# Patient Record
Sex: FEMALE | Race: WHITE | NOT HISPANIC OR LATINO | Employment: OTHER | URBAN - METROPOLITAN AREA
[De-identification: names, ages, dates, MRNs, and addresses within clinical notes are randomized per-mention and may not be internally consistent; named-entity substitution may affect disease eponyms.]

---

## 2023-03-24 LAB
ACETYLCHOL MODUL AB: 15 %
ACHR BLOCKING ABS, SERUM: 9 % (ref 0–26)

## 2023-04-18 ENCOUNTER — OFFICE VISIT (OUTPATIENT)
Dept: PRIMARY CARE | Facility: CLINIC | Age: 88
End: 2023-04-18
Payer: MEDICARE

## 2023-04-18 VITALS
DIASTOLIC BLOOD PRESSURE: 60 MMHG | SYSTOLIC BLOOD PRESSURE: 126 MMHG | HEIGHT: 60 IN | OXYGEN SATURATION: 96 % | WEIGHT: 156 LBS | HEART RATE: 85 BPM | BODY MASS INDEX: 30.63 KG/M2

## 2023-04-18 DIAGNOSIS — D68.51 HETEROZYGOUS FACTOR V LEIDEN MUTATION (MULTI): ICD-10-CM

## 2023-04-18 DIAGNOSIS — M81.0 OSTEOPOROSIS WITHOUT CURRENT PATHOLOGICAL FRACTURE, UNSPECIFIED OSTEOPOROSIS TYPE: Primary | ICD-10-CM

## 2023-04-18 DIAGNOSIS — L43.8 ORAL LICHEN PLANUS: ICD-10-CM

## 2023-04-18 PROBLEM — J30.9 ALLERGIC RHINITIS: Status: ACTIVE | Noted: 2023-04-18

## 2023-04-18 PROBLEM — E55.9 AVITAMINOSIS D: Status: ACTIVE | Noted: 2023-04-18

## 2023-04-18 PROBLEM — R60.9 PERIPHERAL EDEMA: Status: ACTIVE | Noted: 2023-04-18

## 2023-04-18 PROBLEM — Z99.89 USE OF CANE AS AMBULATORY AID: Status: ACTIVE | Noted: 2023-04-18

## 2023-04-18 PROBLEM — G89.29 CHRONIC LOW BACK PAIN: Status: ACTIVE | Noted: 2023-04-18

## 2023-04-18 PROBLEM — M54.50 CHRONIC LOW BACK PAIN: Status: ACTIVE | Noted: 2023-04-18

## 2023-04-18 PROBLEM — I87.2 VENOUS INSUFFICIENCY OF LEFT LOWER EXTREMITY: Status: ACTIVE | Noted: 2023-04-18

## 2023-04-18 PROBLEM — R26.89 UNSTABLE BALANCE: Status: ACTIVE | Noted: 2023-04-18

## 2023-04-18 PROBLEM — E78.5 HYPERLIPIDEMIA: Status: ACTIVE | Noted: 2023-04-18

## 2023-04-18 PROBLEM — M54.2 NECK PAIN: Status: ACTIVE | Noted: 2023-04-18

## 2023-04-18 PROBLEM — R60.0 PERIPHERAL EDEMA: Status: ACTIVE | Noted: 2023-04-18

## 2023-04-18 PROBLEM — M25.569 KNEE PAIN: Status: ACTIVE | Noted: 2023-04-18

## 2023-04-18 PROBLEM — G56.03 BILATERAL CARPAL TUNNEL SYNDROME: Status: ACTIVE | Noted: 2023-04-18

## 2023-04-18 PROCEDURE — 99214 OFFICE O/P EST MOD 30 MIN: CPT | Performed by: FAMILY MEDICINE

## 2023-04-18 PROCEDURE — 1036F TOBACCO NON-USER: CPT | Performed by: FAMILY MEDICINE

## 2023-04-18 PROCEDURE — 1159F MED LIST DOCD IN RCRD: CPT | Performed by: FAMILY MEDICINE

## 2023-04-18 PROCEDURE — 1157F ADVNC CARE PLAN IN RCRD: CPT | Performed by: FAMILY MEDICINE

## 2023-04-18 RX ORDER — CALCIUM CARBONATE 500(1250)
1 TABLET ORAL DAILY
COMMUNITY

## 2023-04-18 RX ORDER — POTASSIUM CHLORIDE 750 MG/1
1 CAPSULE, EXTENDED RELEASE ORAL DAILY
COMMUNITY
Start: 2021-12-06

## 2023-04-18 RX ORDER — ACETAMINOPHEN 500 MG
TABLET ORAL DAILY
COMMUNITY
Start: 2020-11-19

## 2023-04-18 RX ORDER — ASPIRIN 81 MG/1
1 TABLET ORAL DAILY
COMMUNITY
Start: 2020-11-19

## 2023-04-18 RX ORDER — ACETAMINOPHEN, DEXTROMETHORPHAN HBR, DOXYLAMINE SUCCINATE, PHENYLEPHRINE HCL 650; 20; 12.5; 1 MG/30ML; MG/30ML; MG/30ML; MG/30ML
SOLUTION ORAL DAILY
COMMUNITY
Start: 2020-11-19

## 2023-04-18 RX ORDER — TRIAMCINOLONE ACETONIDE 1 MG/G
PASTE DENTAL
Qty: 5 G | Refills: 1 | Status: SHIPPED | OUTPATIENT
Start: 2023-04-18 | End: 2024-03-19 | Stop reason: WASHOUT

## 2023-04-18 RX ORDER — GLUCOSAMINE SULFATE 500 MG
TABLET ORAL
COMMUNITY
Start: 2020-11-19

## 2023-04-18 ASSESSMENT — PATIENT HEALTH QUESTIONNAIRE - PHQ9
2. FEELING DOWN, DEPRESSED OR HOPELESS: NOT AT ALL
SUM OF ALL RESPONSES TO PHQ9 QUESTIONS 1 AND 2: 0
1. LITTLE INTEREST OR PLEASURE IN DOING THINGS: NOT AT ALL

## 2023-04-18 NOTE — PROGRESS NOTES
Subjective   Patient ID: Michela Mckee is a 88 y.o. female who presents for Follow-up (6 month follow up ).    HPI     Review of Systems    Objective   There were no vitals taken for this visit.    Physical Exam    Assessment/Plan
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
## 2023-04-18 NOTE — PATIENT INSTRUCTIONS
Check the dose of the vitamin D at home  Reduce the dose to half since the last level was 90.     Blood pressure is a little elevated at 140/78. Please monitor at home.     Labs in February were reviewed and looks stable.     Try the triamcinolone oral past to the mouth lesions.     Continue follow up with the vascular doctor as needed.   Continue the Lymphedema pumps at home.     DEXA on 6/8/21 showed osteopenia, not osteoporosis. due again in June 2023.  Order given     Continue follow up with the orthopedic doctor for the periodic steroid injections.     Follow up in 6 months for your Medicare Wellness Exam or sooner as needed.

## 2023-08-04 ENCOUNTER — TELEPHONE (OUTPATIENT)
Dept: PRIMARY CARE | Facility: CLINIC | Age: 88
End: 2023-08-04
Payer: MEDICARE

## 2023-10-19 ENCOUNTER — OFFICE VISIT (OUTPATIENT)
Dept: PRIMARY CARE | Facility: CLINIC | Age: 88
End: 2023-10-19
Payer: MEDICARE

## 2023-10-19 VITALS — BODY MASS INDEX: 30.47 KG/M2 | WEIGHT: 156 LBS

## 2023-10-19 DIAGNOSIS — E53.8 LOW SERUM VITAMIN B12: ICD-10-CM

## 2023-10-19 DIAGNOSIS — M25.562 CHRONIC PAIN OF BOTH KNEES: ICD-10-CM

## 2023-10-19 DIAGNOSIS — G89.29 CHRONIC MIDLINE LOW BACK PAIN WITHOUT SCIATICA: ICD-10-CM

## 2023-10-19 DIAGNOSIS — M19.042 ARTHRITIS OF BOTH HANDS: ICD-10-CM

## 2023-10-19 DIAGNOSIS — E78.2 MIXED HYPERLIPIDEMIA: ICD-10-CM

## 2023-10-19 DIAGNOSIS — M81.0 AGE RELATED OSTEOPOROSIS, UNSPECIFIED PATHOLOGICAL FRACTURE PRESENCE: ICD-10-CM

## 2023-10-19 DIAGNOSIS — I83.223: ICD-10-CM

## 2023-10-19 DIAGNOSIS — M54.50 CHRONIC MIDLINE LOW BACK PAIN WITHOUT SCIATICA: ICD-10-CM

## 2023-10-19 DIAGNOSIS — Z00.00 ROUTINE GENERAL MEDICAL EXAMINATION AT HEALTH CARE FACILITY: Primary | ICD-10-CM

## 2023-10-19 DIAGNOSIS — M19.041 ARTHRITIS OF BOTH HANDS: ICD-10-CM

## 2023-10-19 DIAGNOSIS — G89.29 CHRONIC PAIN OF BOTH KNEES: ICD-10-CM

## 2023-10-19 DIAGNOSIS — D68.51 FACTOR V LEIDEN (MULTI): ICD-10-CM

## 2023-10-19 DIAGNOSIS — E55.9 AVITAMINOSIS D: ICD-10-CM

## 2023-10-19 DIAGNOSIS — M25.561 CHRONIC PAIN OF BOTH KNEES: ICD-10-CM

## 2023-10-19 PROCEDURE — 1170F FXNL STATUS ASSESSED: CPT | Performed by: FAMILY MEDICINE

## 2023-10-19 PROCEDURE — 90662 IIV NO PRSV INCREASED AG IM: CPT | Performed by: FAMILY MEDICINE

## 2023-10-19 PROCEDURE — G0439 PPPS, SUBSEQ VISIT: HCPCS | Performed by: FAMILY MEDICINE

## 2023-10-19 PROCEDURE — 1160F RVW MEDS BY RX/DR IN RCRD: CPT | Performed by: FAMILY MEDICINE

## 2023-10-19 PROCEDURE — 1036F TOBACCO NON-USER: CPT | Performed by: FAMILY MEDICINE

## 2023-10-19 PROCEDURE — 99214 OFFICE O/P EST MOD 30 MIN: CPT | Performed by: FAMILY MEDICINE

## 2023-10-19 PROCEDURE — 1159F MED LIST DOCD IN RCRD: CPT | Performed by: FAMILY MEDICINE

## 2023-10-19 PROCEDURE — G0008 ADMIN INFLUENZA VIRUS VAC: HCPCS | Performed by: FAMILY MEDICINE

## 2023-10-19 ASSESSMENT — ACTIVITIES OF DAILY LIVING (ADL)
MANAGING_FINANCES: INDEPENDENT
TAKING_MEDICATION: NEEDS ASSISTANCE
BATHING: INDEPENDENT
GROCERY_SHOPPING: TOTAL CARE
DRESSING: INDEPENDENT
DOING_HOUSEWORK: TOTAL CARE

## 2023-10-19 ASSESSMENT — PATIENT HEALTH QUESTIONNAIRE - PHQ9
1. LITTLE INTEREST OR PLEASURE IN DOING THINGS: NOT AT ALL
SUM OF ALL RESPONSES TO PHQ9 QUESTIONS 1 AND 2: 0
2. FEELING DOWN, DEPRESSED OR HOPELESS: NOT AT ALL
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
## 2023-10-19 NOTE — PATIENT INSTRUCTIONS
Keep taking the vitamin D supplement at 2000 international units of D3 per day over the counter.    Please follow up with ortho hand for the chronic hand pain .     For the right knee pain, you could try more PT or again see Ortho for injection.     Please monitor blood pressure at home.   Goal is less than 130/80.     Continue the lymphedema pumps     Labs in February 2023 were reviewed and looks stable. Order for updated fasting blood work given today     Continue the triamcinolone oral past to the mouth lesions.     Also try the voltaren (diclofenac) gel to rub into the hands.     Continue follow up with dermatologist for cancer checks.     DEXA on 6/8/21 showed osteopenia, not osteoporosis. due again in June 2023.  Order given last visit     Continue follow up with the orthopedic doctor for the periodic steroid injections as needed.     Follow up in March 2024 to review labs months for follow up or sooner as needed.

## 2023-10-31 ENCOUNTER — ANCILLARY PROCEDURE (OUTPATIENT)
Dept: RADIOLOGY | Facility: CLINIC | Age: 88
End: 2023-10-31
Payer: MEDICARE

## 2023-10-31 DIAGNOSIS — M81.0 AGE RELATED OSTEOPOROSIS, UNSPECIFIED PATHOLOGICAL FRACTURE PRESENCE: ICD-10-CM

## 2023-10-31 PROCEDURE — 77080 DXA BONE DENSITY AXIAL: CPT | Performed by: RADIOLOGY

## 2023-10-31 PROCEDURE — 77080 DXA BONE DENSITY AXIAL: CPT

## 2024-03-14 ENCOUNTER — LAB (OUTPATIENT)
Dept: LAB | Facility: LAB | Age: 89
End: 2024-03-14
Payer: MEDICARE

## 2024-03-14 DIAGNOSIS — E53.8 LOW SERUM VITAMIN B12: ICD-10-CM

## 2024-03-14 DIAGNOSIS — E78.2 MIXED HYPERLIPIDEMIA: ICD-10-CM

## 2024-03-14 DIAGNOSIS — D68.51 FACTOR V LEIDEN (MULTI): ICD-10-CM

## 2024-03-14 DIAGNOSIS — E55.9 AVITAMINOSIS D: ICD-10-CM

## 2024-03-14 LAB
25(OH)D3 SERPL-MCNC: 60 NG/ML (ref 30–100)
ALBUMIN SERPL BCP-MCNC: 4.3 G/DL (ref 3.4–5)
ALP SERPL-CCNC: 96 U/L (ref 33–136)
ALT SERPL W P-5'-P-CCNC: 18 U/L (ref 7–45)
ANION GAP SERPL CALC-SCNC: 14 MMOL/L (ref 10–20)
AST SERPL W P-5'-P-CCNC: 17 U/L (ref 9–39)
BASOPHILS # BLD AUTO: 0.06 X10*3/UL (ref 0–0.1)
BASOPHILS NFR BLD AUTO: 0.6 %
BILIRUB SERPL-MCNC: 0.6 MG/DL (ref 0–1.2)
BUN SERPL-MCNC: 21 MG/DL (ref 6–23)
CALCIUM SERPL-MCNC: 9.9 MG/DL (ref 8.6–10.6)
CHLORIDE SERPL-SCNC: 105 MMOL/L (ref 98–107)
CHOLEST SERPL-MCNC: 213 MG/DL (ref 0–199)
CHOLESTEROL/HDL RATIO: 3.6
CO2 SERPL-SCNC: 27 MMOL/L (ref 21–32)
CREAT SERPL-MCNC: 0.73 MG/DL (ref 0.5–1.05)
EGFRCR SERPLBLD CKD-EPI 2021: 79 ML/MIN/1.73M*2
EOSINOPHIL # BLD AUTO: 0.25 X10*3/UL (ref 0–0.4)
EOSINOPHIL NFR BLD AUTO: 2.3 %
ERYTHROCYTE [DISTWIDTH] IN BLOOD BY AUTOMATED COUNT: 13.4 % (ref 11.5–14.5)
GLUCOSE SERPL-MCNC: 124 MG/DL (ref 74–99)
HCT VFR BLD AUTO: 44.3 % (ref 36–46)
HDLC SERPL-MCNC: 59.9 MG/DL
HGB BLD-MCNC: 14.4 G/DL (ref 12–16)
IMM GRANULOCYTES # BLD AUTO: 0.04 X10*3/UL (ref 0–0.5)
IMM GRANULOCYTES NFR BLD AUTO: 0.4 % (ref 0–0.9)
LDLC SERPL CALC-MCNC: 133 MG/DL
LYMPHOCYTES # BLD AUTO: 1.99 X10*3/UL (ref 0.8–3)
LYMPHOCYTES NFR BLD AUTO: 18.6 %
MCH RBC QN AUTO: 28.9 PG (ref 26–34)
MCHC RBC AUTO-ENTMCNC: 32.5 G/DL (ref 32–36)
MCV RBC AUTO: 89 FL (ref 80–100)
MONOCYTES # BLD AUTO: 1.12 X10*3/UL (ref 0.05–0.8)
MONOCYTES NFR BLD AUTO: 10.5 %
NEUTROPHILS # BLD AUTO: 7.24 X10*3/UL (ref 1.6–5.5)
NEUTROPHILS NFR BLD AUTO: 67.6 %
NON HDL CHOLESTEROL: 153 MG/DL (ref 0–149)
NRBC BLD-RTO: 0 /100 WBCS (ref 0–0)
PLATELET # BLD AUTO: 229 X10*3/UL (ref 150–450)
POTASSIUM SERPL-SCNC: 4.4 MMOL/L (ref 3.5–5.3)
PROT SERPL-MCNC: 6.9 G/DL (ref 6.4–8.2)
RBC # BLD AUTO: 4.98 X10*6/UL (ref 4–5.2)
SODIUM SERPL-SCNC: 142 MMOL/L (ref 136–145)
TRIGL SERPL-MCNC: 102 MG/DL (ref 0–149)
TSH SERPL-ACNC: 1.64 MIU/L (ref 0.44–3.98)
VIT B12 SERPL-MCNC: >2000 PG/ML (ref 211–911)
VLDL: 20 MG/DL (ref 0–40)
WBC # BLD AUTO: 10.7 X10*3/UL (ref 4.4–11.3)

## 2024-03-14 PROCEDURE — 85025 COMPLETE CBC W/AUTO DIFF WBC: CPT

## 2024-03-14 PROCEDURE — 80053 COMPREHEN METABOLIC PANEL: CPT

## 2024-03-14 PROCEDURE — 36415 COLL VENOUS BLD VENIPUNCTURE: CPT

## 2024-03-14 PROCEDURE — 82306 VITAMIN D 25 HYDROXY: CPT

## 2024-03-14 PROCEDURE — 80061 LIPID PANEL: CPT

## 2024-03-14 PROCEDURE — 82607 VITAMIN B-12: CPT

## 2024-03-14 PROCEDURE — 84443 ASSAY THYROID STIM HORMONE: CPT

## 2024-03-19 ENCOUNTER — OFFICE VISIT (OUTPATIENT)
Dept: PRIMARY CARE | Facility: CLINIC | Age: 89
End: 2024-03-19
Payer: MEDICARE

## 2024-03-19 VITALS
DIASTOLIC BLOOD PRESSURE: 70 MMHG | BODY MASS INDEX: 30.86 KG/M2 | OXYGEN SATURATION: 87 % | HEART RATE: 87 BPM | SYSTOLIC BLOOD PRESSURE: 122 MMHG | WEIGHT: 158 LBS

## 2024-03-19 DIAGNOSIS — R26.89 UNSTABLE BALANCE: ICD-10-CM

## 2024-03-19 DIAGNOSIS — M19.042 ARTHRITIS OF BOTH HANDS: ICD-10-CM

## 2024-03-19 DIAGNOSIS — H53.9 CHANGE IN VISION: Primary | ICD-10-CM

## 2024-03-19 DIAGNOSIS — G89.29 CHRONIC MIDLINE LOW BACK PAIN WITHOUT SCIATICA: ICD-10-CM

## 2024-03-19 DIAGNOSIS — M25.561 CHRONIC PAIN OF BOTH KNEES: ICD-10-CM

## 2024-03-19 DIAGNOSIS — G89.29 CHRONIC PAIN OF BOTH KNEES: ICD-10-CM

## 2024-03-19 DIAGNOSIS — M25.562 CHRONIC PAIN OF BOTH KNEES: ICD-10-CM

## 2024-03-19 DIAGNOSIS — M54.50 CHRONIC MIDLINE LOW BACK PAIN WITHOUT SCIATICA: ICD-10-CM

## 2024-03-19 DIAGNOSIS — Z99.89 USE OF CANE AS AMBULATORY AID: ICD-10-CM

## 2024-03-19 DIAGNOSIS — M19.041 ARTHRITIS OF BOTH HANDS: ICD-10-CM

## 2024-03-19 PROCEDURE — 1158F ADVNC CARE PLAN TLK DOCD: CPT | Performed by: FAMILY MEDICINE

## 2024-03-19 PROCEDURE — 1160F RVW MEDS BY RX/DR IN RCRD: CPT | Performed by: FAMILY MEDICINE

## 2024-03-19 PROCEDURE — 99214 OFFICE O/P EST MOD 30 MIN: CPT | Performed by: FAMILY MEDICINE

## 2024-03-19 PROCEDURE — 1036F TOBACCO NON-USER: CPT | Performed by: FAMILY MEDICINE

## 2024-03-19 PROCEDURE — 1157F ADVNC CARE PLAN IN RCRD: CPT | Performed by: FAMILY MEDICINE

## 2024-03-19 PROCEDURE — 1123F ACP DISCUSS/DSCN MKR DOCD: CPT | Performed by: FAMILY MEDICINE

## 2024-03-19 PROCEDURE — 1159F MED LIST DOCD IN RCRD: CPT | Performed by: FAMILY MEDICINE

## 2024-03-19 RX ORDER — CHOLECALCIFEROL (VITAMIN D3) 25 MCG
1000 TABLET ORAL DAILY
COMMUNITY

## 2024-03-19 ASSESSMENT — PATIENT HEALTH QUESTIONNAIRE - PHQ9
SUM OF ALL RESPONSES TO PHQ9 QUESTIONS 1 AND 2: 0
2. FEELING DOWN, DEPRESSED OR HOPELESS: NOT AT ALL
1. LITTLE INTEREST OR PLEASURE IN DOING THINGS: NOT AT ALL

## 2024-03-19 NOTE — PATIENT INSTRUCTIONS
Keep taking the vitamin D supplement at 2000 international units of D3 per day over the counter.    Please follow up with ortho hand for the chronic hand pain .     Labs on 3/14/24- Sugar was a little elevated which we can monitor. Normal liver and kidney function. Cholesterol levels look stable. Make sure to stay hydrated. Normal blood counts, no evidence of anemia or infection. B12 is high so please hold/stop any B vitamin supplements. Normal thyroid and vitamin D levels.     For the right knee pain, you could try more PT or again see Ortho for injection. Call Dr Ceron for follow up     Please monitor blood pressure at home. BP is good today at 122/70.   Goal is less than 130/80.     Continue the lymphedema pumps   For the leg swelling, consider getting a chair to get the legs elevated.     Continue the triamcinolone oral past to the mouth lesions as needed     Also try the voltaren (diclofenac) gel to rub into the hands, you can follow up with the hand specialist     Continue follow up with dermatologist for cancer checks.     New handicap placard     DEXA on 6/8/21 showed osteopenia, not osteoporosis. due again in June 2023.  Order given last visit     Follow up in 6 months for your Yearly Medicare Wellness  for a follow up
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
## 2024-04-16 ENCOUNTER — APPOINTMENT (OUTPATIENT)
Dept: PHYSICAL THERAPY | Facility: CLINIC | Age: 89
End: 2024-04-16
Payer: MEDICARE

## 2024-04-16 ENCOUNTER — APPOINTMENT (OUTPATIENT)
Dept: OCCUPATIONAL THERAPY | Facility: CLINIC | Age: 89
End: 2024-04-16
Payer: MEDICARE

## 2024-07-01 ENCOUNTER — TELEPHONE (OUTPATIENT)
Dept: PRIMARY CARE | Facility: CLINIC | Age: 89
End: 2024-07-01
Payer: MEDICARE

## 2024-07-01 VITALS — BODY MASS INDEX: 30.86 KG/M2 | HEIGHT: 60 IN

## 2024-07-01 DIAGNOSIS — U07.1 COVID-19: Primary | ICD-10-CM

## 2024-07-01 RX ORDER — BENZONATATE 100 MG/1
100 CAPSULE ORAL 3 TIMES DAILY PRN
Qty: 42 CAPSULE | Refills: 0 | Status: SHIPPED | OUTPATIENT
Start: 2024-07-01 | End: 2024-07-31

## 2024-07-01 RX ORDER — ALBUTEROL SULFATE 90 UG/1
2 AEROSOL, METERED RESPIRATORY (INHALATION) EVERY 4 HOURS PRN
Qty: 8.5 G | Refills: 5 | Status: SHIPPED | OUTPATIENT
Start: 2024-07-01 | End: 2025-07-01

## 2024-07-01 RX ORDER — FLUTICASONE PROPIONATE 50 MCG
1 SPRAY, SUSPENSION (ML) NASAL DAILY
Qty: 16 G | Refills: 5 | Status: SHIPPED | OUTPATIENT
Start: 2024-07-01 | End: 2025-07-01

## 2024-07-01 NOTE — TELEPHONE ENCOUNTER
Patient's daughter, Kimberly ([phone]) called stating patient started with congestion and cough yesterday. Did home COVID test and was positive. Any rx sent to [facility]    Pt is 89 with COVID   Very congested and some shortness of breath in the nasal area   Some cough   - recent travel, back from a trip seeing son, came back on a plan on Saturday   - getting a cold on Saturday, got worse  - tested positive last night   Not on a statin  No T2DM  BMI 31   GFR 79 on 3/14/24     Sent in the paxlovid  ,floanse, albuterol , and benzonate

## 2024-07-02 ENCOUNTER — APPOINTMENT (OUTPATIENT)
Dept: DERMATOLOGY | Facility: CLINIC | Age: 89
End: 2024-07-02
Payer: MEDICARE

## 2024-07-08 ENCOUNTER — APPOINTMENT (OUTPATIENT)
Dept: DERMATOLOGY | Facility: CLINIC | Age: 89
End: 2024-07-08
Payer: MEDICARE

## 2024-08-27 ENCOUNTER — APPOINTMENT (OUTPATIENT)
Dept: DERMATOLOGY | Facility: CLINIC | Age: 89
End: 2024-08-27
Payer: MEDICARE

## 2024-08-27 DIAGNOSIS — L57.9 SKIN CHANGES DUE TO CHRONIC EXPOSURE TO NONIONIZING RADIATION: ICD-10-CM

## 2024-08-27 DIAGNOSIS — L81.4 LENTIGO: ICD-10-CM

## 2024-08-27 DIAGNOSIS — D18.01 ANGIOMA OF SKIN: Primary | ICD-10-CM

## 2024-08-27 DIAGNOSIS — L21.9 SEBORRHEIC DERMATITIS: ICD-10-CM

## 2024-08-27 DIAGNOSIS — D22.9 BENIGN NEVUS: ICD-10-CM

## 2024-08-27 DIAGNOSIS — Z85.828 HISTORY OF NONMELANOMA SKIN CANCER: ICD-10-CM

## 2024-08-27 DIAGNOSIS — L82.1 SEBORRHEIC KERATOSIS: ICD-10-CM

## 2024-08-27 DIAGNOSIS — L90.5 SCAR CONDITIONS AND FIBROSIS OF SKIN: ICD-10-CM

## 2024-08-27 PROCEDURE — 99213 OFFICE O/P EST LOW 20 MIN: CPT | Performed by: NURSE PRACTITIONER

## 2024-08-27 PROCEDURE — 1036F TOBACCO NON-USER: CPT | Performed by: NURSE PRACTITIONER

## 2024-08-27 PROCEDURE — 1123F ACP DISCUSS/DSCN MKR DOCD: CPT | Performed by: NURSE PRACTITIONER

## 2024-08-27 PROCEDURE — 1160F RVW MEDS BY RX/DR IN RCRD: CPT | Performed by: NURSE PRACTITIONER

## 2024-08-27 PROCEDURE — 1159F MED LIST DOCD IN RCRD: CPT | Performed by: NURSE PRACTITIONER

## 2024-08-27 PROCEDURE — 1157F ADVNC CARE PLAN IN RCRD: CPT | Performed by: NURSE PRACTITIONER

## 2024-08-27 NOTE — PATIENT INSTRUCTIONS

## 2024-08-27 NOTE — PROGRESS NOTES
Subjective     Michela Mckee is a 89 y.o. female who presents for the following: Skin Check.     Review of Systems:  No other skin or systemic complaints other than what is documented elsewhere in the note.    The following portions of the chart were reviewed this encounter and updated as appropriate:   Tobacco  Allergies  Meds  Problems  Med Hx  Surg Hx         Skin Cancer History  No skin cancer on file.      Specialty Problems          Dermatology Problems    Oral lichen planus        Objective   Well appearing patient in no apparent distress; mood and affect are within normal limits.    A full examination was performed including scalp, head, eyes, ears, nose, lips, neck, chest, axillae, abdomen, back, buttocks, bilateral upper extremities, bilateral lower extremities, hands, feet, fingers, toes, fingernails, and toenails. All findings within normal limits unless otherwise noted below.      Assessment/Plan   1. Angioma of skin  Scattered cherry-red papule(s).    A cherry hemangioma is a small macule (small, flat, smooth area) or papule (small, solid bump) formed from an overgrowth of tiny blood vessels in the skin. Cherry hemangiomas are characteristically red or purplish in color. They often first appear in middle adulthood and usually increase in number with age. Cherry hemangiomas are noncancerous (benign) and are common in adults.    The present appearance of the lesion is not worrisome but it should continue to be observed and testing/treatment may be warranted if change occurs.    2. Benign nevus  Scattered, uniform and benign-appearing, regular brown melanocytic papules and macules.    The present appearance of the lesion is not worrisome but it should continue to be observed and testing/treatment may be warranted if change occurs.    3. Seborrheic keratosis  Stuck on verrucous, tan-brown papules and plaques.      Seborrheic keratoses are common noncancerous (benign) growths of unknown cause seen  in adults due to a thickening of an area of the top skin layer. Seborrheic keratoses may appear as if they are stuck on to the skin. They have distinct borders, and they may appear as papules (small, solid bumps) or plaques (solid, raised patches that are bigger than a thumbnail). They may be the same color as your skin, or they may be pink, light brown, darker brown, or very dark brown, or sometimes may appear black.    There is no way to prevent new seborrheic keratoses from forming. Seborrheic keratoses can be removed, but removal is considered a cosmetic issue and is usually not covered by insurance.    PLAN  No treatment is needed unless there is irritation from clothing, such as itching or bleeding.  2.   Some lotions containing alpha hydroxy acids, salicylic acid, or urea may make the areas feel smoother with regular use but will not eliminate them.    4. Lentigo  Scattered tan macules in sun-exposed areas.    A solar lentigo (plural, solar lentigines), also known as a sun-induced freckle or senile lentigo, is a dark (hyperpigmented) lesion caused by natural or artificial ultraviolet (UV) light. Solar lentigines may be single or multiple. This type of lentigo is different from a simple lentigo (lentigo simplex) because it is caused by exposure to UV light. Solar lentigines are benign, but they do indicate excessive sun exposure, a risk factor for the development of skin cancer.    To prevent solar lentigines, avoid exposure to sunlight in midday (10 AM to 3 PM), wear sun-protective clothing (tightly woven clothes and hats), and apply sunscreen (SPF 30 UVA and UVB block).    The present appearance of the lesion is not worrisome but it should continue to be observed and testing/treatment may be warranted if change occurs.    5. Scar conditions and fibrosis of skin  Left upper lip  Well healed scar at the site(s) of prior treatment with no evidence of recurrence.          The scar is clear, there is no evidence  of recurrence.  The present appearance of the scar is not worrisome but it should continue to be observed and testing/treatment may be warranted if change occurs.      6. History of nonmelanoma skin cancer    ABCDEs of melanoma and atypical moles were discussed with the patient.    Patient was instructed to perform monthly self skin examination.  We recommended that the patient have regular full skin exams given an increased risk of subsequent skin cancers.    The patient was instructed to use sun protective behaviors including use of broad spectrum sunscreens and sun protective clothing to reduce risk of skin cancers.    Warning signs of non-melanoma skin cancer discussed.    7. Skin changes due to chronic exposure to nonionizing radiation  Actinic changes in the form of freckles, lentigines and hyper/hypopigmentation     ABCDEs of melanoma and atypical moles were discussed with the patient.    Patient was instructed to perform monthly self skin examination.  We recommended that the patient have regular full skin exams given an increased risk of subsequent skin cancers.    The patient was instructed to use sun protective behaviors including use of broad spectrum sunscreens and sun protective clothing to reduce risk of skin cancers.    Warning signs of non-melanoma skin cancer discussed.    8. Seborrheic dermatitis  Head - Anterior (Face)  Erythematous papules or plaques with greasy scale involving eyebrows, forehead, nose and medial cheeks (R>L cheek)    PLAN    Declined ketoconazole cream but would like to use clotrimazole OTC BID.         Return to clinic in 1 year for skin check/follow up or sooner if needed

## 2024-08-28 NOTE — PROGRESS NOTES
Physical Therapy Evaluation/Treatment    Patient Name: Michela Mckee  MRN: [id number]  Encounter Date: 2024       Visit Number:  1/*** (including evaluation)  Planned total visits: ***  Visit Authorized/insurance considerations:  MEDICARE MMO / NO AUTH / $0 CO PAY / OOP $3000 MET $0 / [place] VISITS / AVAILITY # [id number] MMO REF# [id number]   JS  Progress Report due visit #***    Current Problem:  Problem List Items Addressed This Visit    None      Subjective:  Subjective     SUBJECTIVE:  Main complaint:  ***  Onset Date:  Rx:  2024;  ***  Date of Injury:  ***    Patient reported hx of injury:   ***    What aggravates symptoms:  {sit, bend, stand, walk, lift, twist, lying supine, lying prone, sidelyin}     What improves symptoms:  {sit, bend, stand, walk, lift, twist, lying supine, lying prone, sidelyin}     Previous Medical Treatment:    ***    Relevant PMH:  ***    Red flags:  {basRedFlags:00709}  ***    Imaging:  {BASDiagnostics:55875}  ***    No results found.     Previous Therapy Treatments:    {Previous PT services:01044}  Successful:  {yes/no:31814}  ***  ***    Pt stated Goal:  ***    General:       Precautions:       Pain:       Home Living:       Home type: {dkhousetype:35805}  Stairs: {yes,no:98596}  Lives with: {dkliveswith:12860}    Vocation:    {BASVocation:26791}  Job/Job tasks:  ***    Prior Function Per Pt/Caregiver Report:       OBJECTIVE:  ***    Posture:       Posture:     ROM and Strength:    Lumbar:      {MarjoriengthROMwnlwflsevimal:59501}    Lumbar AROM STRENGTH    Flexion *** ***    Extension *** ***    /////////////////////////////////////////////////////////////    AROM STRENGTH    R L R L   Rotation *** *** *** ***   Sidebend *** *** *** ***     Hip:    {BASstrengthROMwnlwflseebelow:74195}       AROM STRENGTH   Hip R L R L   Hip Flexion *** *** ***/5 ***/5   Hip Extension *** *** ***/5 ***/5   Hip Abduction *** *** ***/5 ***/5   Hip Adduction *** *** ***/5  ***/5   Internal Rotation *** *** ***/5 ***/5   External Rotation *** *** ***/5 ***/5     Knee:    {BASstrengthROMwnlwflseebelow:18696}     AROM STRENGTH   Knee R L R L   Knee Flexion *** *** ***/5 ***/5   Knee Extension *** *** ***/5 ***/5     Ankle:      {BASstrengthROMwnlwflseebelow:96029}       AROM STRENGTH   Ankle R L R L   Dorsiflexion *** *** ***/5 ***/5   Plantarflexion *** *** ***/5 ***/5   Eversion *** *** ***/5 ***/5   Inversion *** *** ***/5 ***/5        Flexibility:       R  L   Pectoralis *** ***   Piriformis *** ***   [place] *** ***   Hip flexor *** ***   Iliotibial band *** ***   Quadriceps *** ***   Hamstring *** ***   Gastroc/Soleus *** ***        Special Tests:     Lumbar  Repeated Flexion in Standing {POSITIVE/NEGATIVE:87299}  after *** times   Repeated Extension in Standing {POSITIVE/NEGATIVE:26050}  after *** times   /////////////////////////////////////////////////////////////////////    Right Left   Anterior Translatory Instability Test {POSITIVE/NEGATIVE:69214} {POSITIVE/NEGATIVE:40412}     Neural   Right Left   Slump {POSITIVE/NEGATIVE:75175} {POSITIVE/NEGATIVE:36811}   SLR {POSITIVE/NEGATIVE:58639} {POSITIVE/NEGATIVE:11731}     Hip   Right Left   Penny {POSITIVE/NEGATIVE:85101} {POSITIVE/NEGATIVE:37594}   Robert {POSITIVE/NEGATIVE:48308} {POSITIVE/NEGATIVE:78885}   Piriformis {POSITIVE/NEGATIVE:04371} {POSITIVE/NEGATIVE:57694}   Scour {POSITIVE/NEGATIVE:24225} {POSITIVE/NEGATIVE:25817}   [place] {POSITIVE/NEGATIVE:02207} {POSITIVE/NEGATIVE:03936}     Pelvis    Comment   Iliac crest {BASsymmetrical/asymmetrical:76891} ***   ASIS {BASsymmetrical/asymmetrical:21609} ***   PSIS {BASsymmetrical/asymmetrical:90706} ***   Sacral base {BASsymmetrical/asymmetrical:76680} ***   Leg length {BASsymmetrical/asymmetrical:59079} ***        Palpation:    Palpation:       Gait:       Balance:       Stairs:       Transfers:       Outcome Measures:  {PT Outcome Measures:64241}     Assessment       Pt is a 89  y.o. female who presents with impairments of ***.  Pt would benefit from skilled physical therapy to improve flexibility, ROM, strength to reduce pain and improve the patient's current level of functioning including routine exercise program.  Pt would also benefit from proper body mechanics and ergonomic training to better manage the patient's symptoms and reduce exacerbation.      Pt's recovery time and progress/outcomes will likely be impacted by the patient;s history of *** and ***.    Plan       OP EDUCATION:       Goals:      Treatments this date:  {PT Treatments:79060}    Perform HEP as instructed and according to handouts.  Monitor pain levels, stop any exercises that increases pain level and report to therapist next visit.      Billed Treatment Times:  {Treatement times:23788}    Therapeutic modalities this date:       Billed Treatment Times:  {Treatement times:24652}    Therapeutic Activities:  {Activity:59026}    OP PT EDUCATION:      ***    Billed Treatment Times:  {Treatement times:15690}    Manual:    ***  Billed Treatment Times:  {Treatement times:57594}    Balance/NMRE:     ***    Billed Treatment Times:  {Treatement times:57212}    Plan for next visit:  ***    1.  Improve LE AROM by ** degrees at deficits  2.  Improve LE strength by ½ mm grade at deficits  3.  Improve bilateral hamstring length to **% of WFL  4.  Improve trunk flexibility to **% of WFL  5.  Improve trunk strength to ** or better  6.  Pain:  ** to **  7.  Functional Outcome Measure:  ** (**%)

## 2024-08-29 ENCOUNTER — TELEPHONE (OUTPATIENT)
Dept: PRIMARY CARE | Facility: CLINIC | Age: 89
End: 2024-08-29
Payer: MEDICARE

## 2024-08-29 NOTE — TELEPHONE ENCOUNTER
Patients daughter lvm asking for a new order for for physical therapy, the order that are in are going to  before her appt.

## 2024-09-05 ENCOUNTER — TELEPHONE (OUTPATIENT)
Dept: PRIMARY CARE | Facility: CLINIC | Age: 89
End: 2024-09-05
Payer: MEDICARE

## 2024-09-24 ENCOUNTER — APPOINTMENT (OUTPATIENT)
Dept: PRIMARY CARE | Facility: CLINIC | Age: 89
End: 2024-09-24
Payer: MEDICARE

## 2024-09-24 VITALS — SYSTOLIC BLOOD PRESSURE: 128 MMHG | HEART RATE: 71 BPM | DIASTOLIC BLOOD PRESSURE: 70 MMHG | OXYGEN SATURATION: 97 %

## 2024-09-24 DIAGNOSIS — E53.8 LOW SERUM VITAMIN B12: ICD-10-CM

## 2024-09-24 DIAGNOSIS — D68.51 FACTOR V LEIDEN (MULTI): ICD-10-CM

## 2024-09-24 DIAGNOSIS — E55.9 AVITAMINOSIS D: ICD-10-CM

## 2024-09-24 DIAGNOSIS — Z00.00 ROUTINE GENERAL MEDICAL EXAMINATION AT HEALTH CARE FACILITY: Primary | ICD-10-CM

## 2024-09-24 DIAGNOSIS — E78.2 MIXED HYPERLIPIDEMIA: ICD-10-CM

## 2024-09-24 DIAGNOSIS — R26.89 UNSTABLE BALANCE: ICD-10-CM

## 2024-09-24 PROBLEM — I83.223: Status: RESOLVED | Noted: 2023-10-19 | Resolved: 2024-09-24

## 2024-09-24 PROCEDURE — 1157F ADVNC CARE PLAN IN RCRD: CPT | Performed by: FAMILY MEDICINE

## 2024-09-24 PROCEDURE — G0439 PPPS, SUBSEQ VISIT: HCPCS | Performed by: FAMILY MEDICINE

## 2024-09-24 PROCEDURE — 99213 OFFICE O/P EST LOW 20 MIN: CPT | Performed by: FAMILY MEDICINE

## 2024-09-24 PROCEDURE — G0008 ADMIN INFLUENZA VIRUS VAC: HCPCS | Performed by: FAMILY MEDICINE

## 2024-09-24 PROCEDURE — 1160F RVW MEDS BY RX/DR IN RCRD: CPT | Performed by: FAMILY MEDICINE

## 2024-09-24 PROCEDURE — 1159F MED LIST DOCD IN RCRD: CPT | Performed by: FAMILY MEDICINE

## 2024-09-24 PROCEDURE — 99397 PER PM REEVAL EST PAT 65+ YR: CPT | Performed by: FAMILY MEDICINE

## 2024-09-24 PROCEDURE — 1123F ACP DISCUSS/DSCN MKR DOCD: CPT | Performed by: FAMILY MEDICINE

## 2024-09-24 PROCEDURE — 1036F TOBACCO NON-USER: CPT | Performed by: FAMILY MEDICINE

## 2024-09-24 PROCEDURE — 90662 IIV NO PRSV INCREASED AG IM: CPT | Performed by: FAMILY MEDICINE

## 2024-09-24 PROCEDURE — 1170F FXNL STATUS ASSESSED: CPT | Performed by: FAMILY MEDICINE

## 2024-09-24 RX ORDER — FUROSEMIDE 20 MG/1
20 TABLET ORAL DAILY
COMMUNITY
Start: 2021-12-06

## 2024-09-24 ASSESSMENT — PATIENT HEALTH QUESTIONNAIRE - PHQ9
2. FEELING DOWN, DEPRESSED OR HOPELESS: NOT AT ALL
1. LITTLE INTEREST OR PLEASURE IN DOING THINGS: NOT AT ALL
SUM OF ALL RESPONSES TO PHQ9 QUESTIONS 1 AND 2: 0

## 2024-09-24 ASSESSMENT — ACTIVITIES OF DAILY LIVING (ADL)
GROCERY_SHOPPING: NEEDS ASSISTANCE
TAKING_MEDICATION: INDEPENDENT
BATHING: INDEPENDENT
DOING_HOUSEWORK: NEEDS ASSISTANCE
MANAGING_FINANCES: INDEPENDENT
DRESSING: INDEPENDENT

## 2024-09-24 NOTE — LETTER
Dear Physical Therapist,     Please help Ms. Mckee with mobility and strength. We would like to focus on strength to get up from a chair with armrest. Also getting in and about of the bed, shower ( stepping over the tub wall), and a car. We want to keep Ms. Mckee in her home as long as possible and also to help reduce risk of falls and injuries.     Please feel free to contact me for any further questions or concerns.     Thank you,       Khushi Mccabe DO, [facility]  Board Certified Family Physician   [address] [place] [address].   [id number]  [place] [address]  P: [phone]  F: [phone]

## 2024-09-24 NOTE — PATIENT INSTRUCTIONS
Keep taking the vitamin D supplement at 2000 international units of D3 per day over the counter.    Start the PT for the leg strengthening     Labs on 3/14/24- Sugar was a little elevated which we can monitor. Normal liver and kidney function. Cholesterol levels look stable. Make sure to stay hydrated. Normal blood counts, no evidence of anemia or infection. B12 is high so please hold/stop any B vitamin supplements. Normal thyroid and vitamin D levels.     For the right knee pain, you could try more PT or again see Ortho for injection.     Please monitor blood pressure at home. BP is good today at 128/70.   Goal is less than 130/80.     Continue the lymphedema pumps   For the leg swelling, consider getting a chair to get the legs elevated.     Also try the voltaren (diclofenac) gel to rub into the hands, you can follow up with the hand specialist as needed, glad the hand procedure went well    Continue follow up with dermatologist for cancer checks.     DEXA was on 10/31/23-Bone density showed persistence of the osteopenia, this is good. We can repeat in 2-3 years if she would like.     Follow up in 6 months for a follow up

## 2024-09-24 NOTE — PROGRESS NOTES
DEXA shows ostopenia, repeat in 2 years (Oct 2023)   Overall: no updates  Diet, exercise, water: Has two meals per day with snacks, eats healthy. Doesn't drink much water (couple glasses plus coffee). Does not exercise- difficulty walking from knees.   Starting PT tomorrow for back and knees. Frustrated with previous PT experience.   Sleep: never sleeps well, gets 5 hours per night with waking up. Feels well rested. No concerns.   Hands doing good, just some numbness on tips of bilateral fingers. Exercises hands.   Bowel, bladder:   Vision: corner of right eye seeing things, like previous left eye. Saw people clearly last year out of left. Right eye is a 'wall like a closet' full of colors.   Migraines changed- started small and grew, now small and focused. Taking pills for mac degen?   Meds: No concerns with meds, only takes vitamins.   Chest pain, SOB, swell: No concerns.   Saw vein specialist in the past, worked, may need to see again. Current swelling.   Mood: No concerns.   Eufexa shot in right knee in [place], 3 shot series repeated every 6 months (Oct) for pain  Left hand surgery recently to allow movement of fingers (May 2024) with Dr. Walker     Doesn't recall ever having inhaler.   Uses flonase rarely (twice a year)   Restart vitamin B? COVID vaccines?   Had COVID July 1st, received paxlovid
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
## 2024-09-25 ENCOUNTER — EVALUATION (OUTPATIENT)
Dept: PHYSICAL THERAPY | Facility: CLINIC | Age: 89
End: 2024-09-25
Payer: MEDICARE

## 2024-09-25 ENCOUNTER — APPOINTMENT (OUTPATIENT)
Dept: PHYSICAL THERAPY | Facility: CLINIC | Age: 89
End: 2024-09-25
Payer: MEDICARE

## 2024-09-25 DIAGNOSIS — Z99.89 DEPENDENCE ON OTHER ENABLING MACHINES AND DEVICES: ICD-10-CM

## 2024-09-25 DIAGNOSIS — M25.561 PAIN IN RIGHT KNEE: ICD-10-CM

## 2024-09-25 DIAGNOSIS — R26.89 OTHER ABNORMALITIES OF GAIT AND MOBILITY: ICD-10-CM

## 2024-09-25 DIAGNOSIS — M25.562 PAIN IN LEFT KNEE: ICD-10-CM

## 2024-09-25 DIAGNOSIS — M54.50 LOW BACK PAIN, UNSPECIFIED: ICD-10-CM

## 2024-09-25 DIAGNOSIS — G89.29 OTHER CHRONIC PAIN: ICD-10-CM

## 2024-09-25 DIAGNOSIS — M19.041 PRIMARY OSTEOARTHRITIS, RIGHT HAND: ICD-10-CM

## 2024-09-25 DIAGNOSIS — M19.042 PRIMARY OSTEOARTHRITIS, LEFT HAND: ICD-10-CM

## 2024-09-25 PROCEDURE — 97162 PT EVAL MOD COMPLEX 30 MIN: CPT | Mod: GP | Performed by: PHYSICAL THERAPIST

## 2024-09-25 ASSESSMENT — PAIN SCALES - GENERAL: PAINLEVEL_OUTOF10: 0 - NO PAIN

## 2024-09-25 ASSESSMENT — PAIN - FUNCTIONAL ASSESSMENT: PAIN_FUNCTIONAL_ASSESSMENT: 0-10

## 2024-09-25 NOTE — PROGRESS NOTES
Physical Therapy Evaluation/Treatment    Patient Name: Michela Mckee  MRN: [id number]  Encounter Date: 9/25/2024  Time Calculation  Start Time: 1130  Stop Time: 1200  Time Calculation (min): 30 min    Visit Number:  1/12 (including evaluation)  Planned total visits: 12  Visit Authorized/insurance considerations:  NO AUTH, 30.00 CO PAY, 100% COVERAGE, 3000 OOP-NOT MET, MMO   Progress Report due visit #10    Pt arrives very late for appt but was accommodated    Current Problem:  Problem List Items Addressed This Visit    None  Visit Diagnoses         Codes    Low back pain, unspecified     M54.50    Relevant Orders    Follow Up In Physical Therapy    Other chronic pain     G89.29    Relevant Orders    Follow Up In Physical Therapy    Pain in right knee     M25.561    Relevant Orders    Follow Up In Physical Therapy    Pain in left knee     M25.562    Relevant Orders    Follow Up In Physical Therapy    Primary osteoarthritis, right hand     M19.041    Primary osteoarthritis, left hand     M19.042    Other abnormalities of gait and mobility     R26.89    Relevant Orders    Follow Up In Physical Therapy    Dependence on other enabling machines and devices     Z99.89    Relevant Orders    Follow Up In Physical Therapy          Subjective:  Subjective     SUBJECTIVE:  Main complaint:  Her knees hurt, she has a hard time walking and getting up from a chair.    Onset Date:  Rx:  03/19/2024;    Date of Injury:  chronic    Patient reported hx of injury:   Worsening weakness and difficulty with functional mobility    Previous Medical Treatment:    None  3 week gel treatment in right knee - pt states helped but is considering a second one    Relevant PMH:  Chronic back pain  Osteoporosis  Chronic knee pain  Lymphedema left leg    Red flags:  Urinary incontinence (?), sometimes wears a pad if going out and doesn't know the restroom situation.  Sometimes doesn't make it in time to the bathroom at home due to having to  negotiate the stairs which she climbs one step at a time, very slowly    Imaging:  Other: bone scan 2023 for osteoporosis.  Per pat report is taking Vit D.  No longer taking Fosamax since she had been taking OP medication for a long time    No results found.     Previous Therapy Treatments:    Nothing currently    Pt stated Goal:    Able to get up from chairs in community (she has been needing assistance at times because not all the chairs have arm rests)    Walk better    General:  General  General Comment: Pt reports a fall this past Sunday going down steps.  She didn't have another objective to hold onto, just her cane.  She put her can down, it was unsteady and she fell on her buttocks.  Okay sitting down now.  She had a physical with he PCP yesterday.  Her doctor is aware of the fall    Precautions:  Precautions  Precautions Comment: Osteoporosis, h/o falls    Pain:  Pain Assessment: 0-10  0-10 (Numeric) Pain Score: 0 - No pain (Pt reports she doesn't have pain unless she does some to cause it.  When she sits, it relieves)    Home Living:  Home Living Comment: yes    Home type: House  Stairs: Yes (no bathroom on main floor), bed and bath upstairs  Lives with: Alone    Vocation:    Retired  Job/Job tasks:  very active (still with the [employer], [employer], [employer] and [employer])    Prior Function Per Pt/Caregiver Report:  Level of [place]: Independent with ADLs and functional transfers, Independent with homemaking with ambulation (Daughter's and son assist when needed)    OBJECTIVE:    Posture:  Posture Comment: forward head, rounded shoulders    Posture:     ROM and Strength:    Lumbar:      See below    Lumbar AROM STRENGTH    Flexion 40% limited poor    Extension Neutral  poor    /////////////////////////////////////////////////////////////     Hip:    See below       AROM STRENGTH   Hip R L R L   Hip Flexion 50% limited 50% limited 3-/5 3-/5   Hip Abduction WFL WFL 3-/5 3-/5   Hip Adduction  WFL WFL 3-/5 3-/5     Knee:    See below     AROM STRENGTH   Knee R L R L   Knee Flexion WFL WFL 3-/5 3-/5   Knee Extension WFL WFL 3/5 3/5        Flexibility:       R  L   Pectoralis tight tight   Hamstring tight tight   Gastroc/Soleus tight tight     Gait:  Gait Comment: SPC jhurry cane, decreased nir, decreased step length, decreased heel strike, stiff gait slightly wider FERNY, pt very cautious    Transfers:  Transfers Comment: Needs a couple attempts to move from sit to stand.  Pt procedes cauitiously.  Unable to transfer without UE A    Outcome Measures:  Other Measures  Lower Extremity Funtional Score (LEFS): 24/80 (30%)    Assessment  PT Assessment Results: Decreased strength, Decreased range of motion, Decreased endurance, Impaired balance, Decreased mobility  Rehab Prognosis: Good  Assessment Comment: Pt presents with significant core and LE weakness impacting her functional mobility.  Pt reports having issues mainly in the community.  PCP noted issues in the home setting.  Pt agreed to OP therapy.  Pt educated on [facility] home services which pt would be appropriate for and receptive to.  Pt would receive OP care at home which would better address her mobility issues at home.  PT to contact pt's daughter Kimberly and PCP to discuss.    Pt is a 89 y.o. female who presents with significant core and LE weakness impacting her functional mobility. She reports a fall this past week.  Pt would benefit from skilled physical therapy to improve flexibility, ROM, strength to reduce pain and improve the patient's current level of functioning including routine exercise program.  Pt would also benefit from proper body mechanics and ergonomic training to better manage the patient's symptoms and reduce exacerbation.      Pt's recovery time and progress/outcomes will likely be impacted by the patient's history of chronic back and knee pain and elevated age    Plan  Treatment/Interventions: Education/ Instruction, Gait  training, Manual therapy, Neuromuscular re-education, Therapeutic activities, Therapeutic exercises  PT Plan: Skilled PT  PT Frequency: 2 times per week  Duration: 12 weeks  Onset Date: 03/19/24  Certification Period Start Date: 09/25/24  Certification Period End Date: 12/24/24  Number of Treatments Authorized: 12  Rehab Potential: Good  Plan of Care Agreement: Patient    OP EDUCATION:  Outpatient Education  Individual(s) Educated: Patient  Education Provided: Fall Risk, Home Exercise Program, Home Safety, POC    Goals:  Active       PT Problem - generalized weakness       PT Goal 1 - STG       Start:  09/25/24    Expected End:  11/09/24       1.  Improve LE AROM to WFL at deficits  2.  Improve LE strength to 4/5 grade at deficits  3.  Improve bilateral hamstring length to 90%  WFL  4.  Improve trunk flexibility to 90% of WFL  5.  Improve trunk strength to Good- or better  6.  Functional Outcome Measure:  50 (62%)               PT Goal 2 - LTG       Start:  09/25/24    Expected End:  12/24/24       LT FUNCTIONAL GOALS  1. Pt reports no falls indoors or outdoors  2. Pt reports able to transfer from a chair in the community without assistance 80% of the time  3. Pt demonstrates improved gait pattern to safely negotiate stairs at home, in the community and on level surfaces.    4. Pt reports less difficulty 50% of the time with car transfers              Patient Stated Goal 1       Start:  09/25/24    Expected End:  12/24/24       Able to get up from chairs in community (she has been needing assistance at times because not all the chairs have arm rests)    Walk better             Plan for next visit:  Introduce DLS and core exercises, postural re-education, LE strengthening, transfer and gait training.  Pt has had recent falls and lives alone. Goal is to keep patient living at home safely and pt wants to continue to be active in the community.

## 2024-09-25 NOTE — LETTER
September 25, 2024     Patient: Michela Mckee   YOB: 1935   Date of Visit: 9/25/2024       I evaluated your patient today.  Thank you for the information you provided me.  I've been working with Michela over the years and am familiar with her home and functional issues she has faced.  Although [facility],  OP therapy in the home setting may be more appropriate. [facility] offers OP PT (not homecare) performed in the patient's home.  I feel her issues at home would be more directly addressed in a home setting.  Michela is receptive.  I also left a message for her daughter to discuss [facility] as an option.  Once again, thank you for the added information.     I will plan to treat her at the [facility] unless she and her Daughter Kimberly are in agreement to seek [facility] services.      If you have any questions or concerns, please don't hesitate to call.         Sincerely,         Shanthi Spears, PT        CC: No Recipients

## 2024-09-27 DIAGNOSIS — M25.562 CHRONIC PAIN OF BOTH KNEES: ICD-10-CM

## 2024-09-27 DIAGNOSIS — R29.898 LEG WEAKNESS, BILATERAL: ICD-10-CM

## 2024-09-27 DIAGNOSIS — Z99.89 USE OF CANE AS AMBULATORY AID: ICD-10-CM

## 2024-09-27 DIAGNOSIS — R26.89 UNSTABLE BALANCE: Primary | ICD-10-CM

## 2024-09-27 DIAGNOSIS — M25.561 CHRONIC PAIN OF BOTH KNEES: ICD-10-CM

## 2024-09-27 DIAGNOSIS — G89.29 CHRONIC PAIN OF BOTH KNEES: ICD-10-CM

## 2024-10-15 NOTE — PROGRESS NOTES
Physical Therapy Treatment    Patient Name: Michela Mckee  MRN: [id number]  Encounter date:  10/17/2024  Time Calculation  Start Time: 1205     PT Therapeutic Procedures Time Entry  Therapeutic Exercise Time Entry: 20  Therapeutic Activity Time Entry: 5    Visit Number:  2 (including evaluation)  Planned total visits: 12  Visit Authorized/insurance considerations:  NO AUTH, 30.00 CO PAY, 100% COVERAGE, 3000 OOP-NOT MET, MMO   Progress Report due visit #10    Current Problem  Problem List Items Addressed This Visit    None  Visit Diagnoses         Codes    Low back pain, unspecified     M54.50    Other chronic pain     G89.29    Pain in right knee     M25.561    Pain in left knee     M25.562    Other abnormalities of gait and mobility     R26.89    Dependence on other enabling machines and devices     Z99.89           Pt arrived late and was accommodated    Precautions  Precautions  Precautions Comment: Osteoporosis, h/o falls      Pain  Pain Assessment: 0-10  0-10 (Numeric) Pain Score: 8  Response to Interventions: Post treatment;  pt did not state but didn't appear any of the exercises increased her pain    Subjective  General  General Comment: Pt's back hurts a lot and her knees hurt.  Pt reports pain has been bad this week.    PT  Visit  Response to Previous Treatment: Patient with no complaints from previous session.    Objective  Pt walking very slowly and tentatively today.  Using a SPC.      Pt taken out to her care using w/c due to her level of fatigue today.      Treatment:  Therapeutic Exercise  Therapeutic Exercise Performed: Yes  Introduce DLS and core exercises, postural re-education, LE strengthening, transfer and gait training. Pt has had recent falls and lives alone. Goal is to keep patient living at home safely and pt wants to continue to be active in the community.     Exercises were paced so pt did not get more fatigued.      Ankle pumps, HR 20x  Adduction, rainbow ball 5s hold  15x  Abduction OTB 5s hold 15x  LAQ 2x10x  Mini march 10x    Current HEP:  Abdominal bracing  LAQ  AP    Billed Treatment Times:  Therapeutic Exercise 20 min       Therapeutic Activity:  Pt educated in home safety.  Alternatives to down sizing, stair chair, throw rugs, BSC is not feasible because she  is unable to empty the the commode because there is not bathroom on the main floor.  Pt educated:  if her goal is to stay in her home, rather than feeling things are taken away from her (I.e., throw rugs), what changes can be made to allow her to stay in her home safely  Billed Treatment Times:  Therapeutic Activity 5 min    OP EDUCATION:  Outpatient Education  Individual(s) Educated: Patient  Education Provided: Home Safety    Assessment:  PT Assessment  Assessment Comment: Pt weak today.  Ambulation was slow and tentative at first but nir improved after pt began walking down the [place].  She was able to perform the exercises with minimal effort  Areas of improvements:   Pt educated in home safety.  Limitations/deficits:  Weakness, Unstable, and pain (generalized, knee, hands, arms, back)    Plan:     Continue with current POC/no changes    Assessment of current progress against goals:  Progressing toward functional goals    Goals:  Active       PT Problem - generalized weakness       PT Goal 1 - STG       Start:  09/25/24    Expected End:  11/09/24       1.  Improve LE AROM to WFL at deficits  2.  Improve LE strength to 4/5 grade at deficits  3.  Improve bilateral hamstring length to 90%  WFL  4.  Improve trunk flexibility to 90% of WFL  5.  Improve trunk strength to Good- or better  6.  Functional Outcome Measure:  50 (62%)               PT Goal 2 - LTG       Start:  09/25/24    Expected End:  12/24/24       LT FUNCTIONAL GOALS  1. Pt reports no falls indoors or outdoors  2. Pt reports able to transfer from a chair in the community without assistance 80% of the time  3. Pt demonstrates improved gait pattern to  safely negotiate stairs at home, in the community and on level surfaces.    4. Pt reports less difficulty 50% of the time with car transfers              Patient Stated Goal 1       Start:  09/25/24    Expected End:  12/24/24       Able to get up from chairs in community (she has been needing assistance at times because not all the chairs have arm rests)    Walk better

## 2024-10-17 ENCOUNTER — TREATMENT (OUTPATIENT)
Dept: PHYSICAL THERAPY | Facility: CLINIC | Age: 89
End: 2024-10-17
Payer: MEDICARE

## 2024-10-17 DIAGNOSIS — M25.561 PAIN IN RIGHT KNEE: ICD-10-CM

## 2024-10-17 DIAGNOSIS — G89.29 OTHER CHRONIC PAIN: ICD-10-CM

## 2024-10-17 DIAGNOSIS — M25.562 PAIN IN LEFT KNEE: ICD-10-CM

## 2024-10-17 DIAGNOSIS — M54.50 LOW BACK PAIN, UNSPECIFIED: ICD-10-CM

## 2024-10-17 DIAGNOSIS — R26.89 OTHER ABNORMALITIES OF GAIT AND MOBILITY: ICD-10-CM

## 2024-10-17 DIAGNOSIS — Z99.89 DEPENDENCE ON OTHER ENABLING MACHINES AND DEVICES: ICD-10-CM

## 2024-10-17 PROCEDURE — 97110 THERAPEUTIC EXERCISES: CPT | Mod: GP | Performed by: PHYSICAL THERAPIST

## 2024-10-17 ASSESSMENT — PAIN - FUNCTIONAL ASSESSMENT: PAIN_FUNCTIONAL_ASSESSMENT: 0-10

## 2024-10-17 ASSESSMENT — PAIN SCALES - GENERAL: PAINLEVEL_OUTOF10: 8

## 2024-10-21 ENCOUNTER — APPOINTMENT (OUTPATIENT)
Dept: PHYSICAL THERAPY | Facility: CLINIC | Age: 89
End: 2024-10-21
Payer: MEDICARE

## 2024-10-24 ENCOUNTER — TREATMENT (OUTPATIENT)
Dept: PHYSICAL THERAPY | Facility: CLINIC | Age: 89
End: 2024-10-24
Payer: MEDICARE

## 2024-10-24 DIAGNOSIS — G89.29 OTHER CHRONIC PAIN: ICD-10-CM

## 2024-10-24 DIAGNOSIS — Z99.89 DEPENDENCE ON OTHER ENABLING MACHINES AND DEVICES: ICD-10-CM

## 2024-10-24 DIAGNOSIS — M54.50 LOW BACK PAIN, UNSPECIFIED: ICD-10-CM

## 2024-10-24 DIAGNOSIS — R26.89 OTHER ABNORMALITIES OF GAIT AND MOBILITY: ICD-10-CM

## 2024-10-24 DIAGNOSIS — M25.562 PAIN IN LEFT KNEE: ICD-10-CM

## 2024-10-24 DIAGNOSIS — M25.561 PAIN IN RIGHT KNEE: ICD-10-CM

## 2024-10-30 ENCOUNTER — TREATMENT (OUTPATIENT)
Dept: PHYSICAL THERAPY | Facility: CLINIC | Age: 89
End: 2024-10-30
Payer: MEDICARE

## 2024-10-30 DIAGNOSIS — Z99.89 DEPENDENCE ON OTHER ENABLING MACHINES AND DEVICES: ICD-10-CM

## 2024-10-30 DIAGNOSIS — R26.89 OTHER ABNORMALITIES OF GAIT AND MOBILITY: ICD-10-CM

## 2024-10-30 DIAGNOSIS — G89.29 OTHER CHRONIC PAIN: ICD-10-CM

## 2024-10-30 DIAGNOSIS — M25.562 PAIN IN LEFT KNEE: ICD-10-CM

## 2024-10-30 DIAGNOSIS — M54.50 LOW BACK PAIN, UNSPECIFIED: ICD-10-CM

## 2024-10-30 DIAGNOSIS — M25.561 PAIN IN RIGHT KNEE: ICD-10-CM

## 2024-10-30 PROCEDURE — 97110 THERAPEUTIC EXERCISES: CPT | Mod: GP,CQ

## 2024-10-30 PROCEDURE — 97112 NEUROMUSCULAR REEDUCATION: CPT | Mod: GP,CQ

## 2024-10-30 ASSESSMENT — PAIN - FUNCTIONAL ASSESSMENT: PAIN_FUNCTIONAL_ASSESSMENT: 0-10

## 2024-10-30 ASSESSMENT — PAIN SCALES - GENERAL: PAINLEVEL_OUTOF10: 1

## 2024-11-05 NOTE — PROGRESS NOTES
Physical Therapy Treatment    Patient Name: Michela Mckee  MRN: [id number]  Encounter date:  11/6/2024  Time Calculation  Start Time: 1216  Stop Time: 1258  Time Calculation (min): 42 min     PT Therapeutic Procedures Time Entry  Neuromuscular Re-Education Time Entry: 10  Therapeutic Exercise Time Entry: 30    Visit Number:  4 (including evaluation)  Planned total visits: 12  Visit Authorized/insurance coverage:  NO AUTH, 30.00 CO PAY, 100% COVERAGE, 3000 OOP-NOT MET, MMO   Progress Report due visit #10       Current Problem  Problem List Items Addressed This Visit    None  Visit Diagnoses         Codes    Low back pain, unspecified     M54.50    Other chronic pain     G89.29    Pain in right knee     M25.561    Pain in left knee     M25.562    Other abnormalities of gait and mobility     R26.89    Dependence on other enabling machines and devices     Z99.89             Precautions  Precautions  Precautions Comment: Osteoporosis, h/o falls      Pain  Pain Assessment: 0-10  0-10 (Numeric) Pain Score: 4  Response to Interventions: post treatment 4    Subjective  General  General Comment: did okay after the last visit and felt comfortable with [place] No falls but caught her foot on stand for spectrum box but did not fall. Pt reports having a sickening pain on the right flank. She couldn't move for 4 hours while sitting on the commode. When she was able to move, she called her Daughter who came over. Gave her electrolytes and she felt better.    PT  Visit  Response to Previous Treatment: Patient with no complaints from previous session.    Objective  Signficantly decreased gait pattern, shuffling gait, hurry cane    Treatment:  Therapeutic Exercise  Therapeutic Exercise Performed: Yes  Introduce DLS and core exercises, postural re-education, LE strengthening, transfer and gait training. Pt has had recent falls and lives alone. Goal is to keep patient living at home safely and pt wants to continue to be active in  "the community.      Exercises were paced and alteranted standing and sitting so to avoid fatigue.       Ankle pumps, HR 20x  Adduction, folded pillow ball 5s hold 15x  Abduction OTB 5s hold 15x  LAQ 2 x12  Standing heel/toe x15 each     Gentle HS Stretching 2 x30\"     11 bars:  Standing Mini march 15x  Side stepping 4 x 8 ft.  Heel  taps to 4\" step 2 x10 R/L  Forward, retro walk 2 x 8 ft        Current HEP:  Abdominal bracing  LAQ  AP    Billed Treatment Times:  Therapeutic Exercise 30 min       Balance/NMRE:      // bars:  Romberg stance  2 x30\" Without UE  Narrow FERNY 2 x 30\"  without UE  Billed Treatment Times:  Neuromuscular Re-education 10 min    OP EDUCATION:  Outpatient Education  Individual(s) Educated: Patient  Education Provided: Home Exercise Program  Education Comment: footwear    Assessment:  PT Assessment  Assessment Comment: Pt has difficulty lifting feet to appropriate height during standing activitiies/balance.  Footwear is a concern and may be contributing to her balance issues at home.  According to the patient her foot options are limited due to the edema in her feet, weight of shoes and thicknes of soles.  She fell one time wearing ivelisse style shoes, other shoes are problematic on her instep  Areas of improvements:   Pt required CGA for retro walking  Limitations/deficits:  Weakness and Unstable    Plan:     Continue with current POC/no changes    Assessment of current progress against goals:  Progressing toward functional goals    Goals:  Active       PT Problem - generalized weakness       PT Goal 1 - STG       Start:  09/25/24    Expected End:  11/09/24       1.  Improve LE AROM to WFL at deficits  2.  Improve LE strength to 4/5 grade at deficits  3.  Improve bilateral hamstring length to 90%  WFL  4.  Improve trunk flexibility to 90% of WFL  5.  Improve trunk strength to Good- or better  6.  Functional Outcome Measure:  50 (62%)               PT Goal 2 - LTG       Start:  09/25/24    " Expected End:  12/24/24       LT FUNCTIONAL GOALS  1. Pt reports no falls indoors or outdoors  2. Pt reports able to transfer from a chair in the community without assistance 80% of the time  3. Pt demonstrates improved gait pattern to safely negotiate stairs at home, in the community and on level surfaces.    4. Pt reports less difficulty 50% of the time with car transfers              Patient Stated Goal 1       Start:  09/25/24    Expected End:  12/24/24       Able to get up from chairs in community (she has been needing assistance at times because not all the chairs have arm rests)    Walk better

## 2024-11-06 ENCOUNTER — TREATMENT (OUTPATIENT)
Dept: PHYSICAL THERAPY | Facility: CLINIC | Age: 89
End: 2024-11-06
Payer: MEDICARE

## 2024-11-06 DIAGNOSIS — M54.50 LOW BACK PAIN, UNSPECIFIED: ICD-10-CM

## 2024-11-06 DIAGNOSIS — G89.29 OTHER CHRONIC PAIN: ICD-10-CM

## 2024-11-06 DIAGNOSIS — R26.89 OTHER ABNORMALITIES OF GAIT AND MOBILITY: ICD-10-CM

## 2024-11-06 DIAGNOSIS — M25.561 PAIN IN RIGHT KNEE: ICD-10-CM

## 2024-11-06 DIAGNOSIS — M25.562 PAIN IN LEFT KNEE: ICD-10-CM

## 2024-11-06 DIAGNOSIS — Z99.89 DEPENDENCE ON OTHER ENABLING MACHINES AND DEVICES: ICD-10-CM

## 2024-11-06 PROCEDURE — 97110 THERAPEUTIC EXERCISES: CPT | Mod: GP | Performed by: PHYSICAL THERAPIST

## 2024-11-06 PROCEDURE — 97112 NEUROMUSCULAR REEDUCATION: CPT | Mod: GP | Performed by: PHYSICAL THERAPIST

## 2024-11-06 ASSESSMENT — PAIN SCALES - GENERAL: PAINLEVEL_OUTOF10: 4

## 2024-11-06 ASSESSMENT — PAIN - FUNCTIONAL ASSESSMENT: PAIN_FUNCTIONAL_ASSESSMENT: 0-10

## 2024-11-08 ENCOUNTER — APPOINTMENT (OUTPATIENT)
Dept: PHYSICAL THERAPY | Facility: CLINIC | Age: 89
End: 2024-11-08
Payer: MEDICARE

## 2024-11-09 NOTE — PROGRESS NOTES
Physical Therapy Treatment    Patient Name: Michela Mckee  MRN: [id number]  Encounter date:  11/12/2024  Time Calculation  Start Time: 1112  Stop Time: 1145  Time Calculation (min): 33 min     PT Therapeutic Procedures Time Entry  Therapeutic Exercise Time Entry: 30    Visit Number:  5 (including evaluation)  Planned total visits: 12  Visit Authorized/insurance coverage:  NO AUTH, 30.00 CO PAY, 100% COVERAGE, 3000 OOP-NOT MET, MMO   Progress Report due visit #10    Pt arrives late stating no close parking spaces.  Accommodated with a shorter visit.    Current Problem  Problem List Items Addressed This Visit    None  Visit Diagnoses         Codes    Low back pain, unspecified     M54.50    Other chronic pain     G89.29    Pain in right knee     M25.561    Pain in left knee     M25.562    Other abnormalities of gait and mobility     R26.89    Dependence on other enabling machines and devices     Z99.89           Precautions  Precautions  Precautions Comment: Osteoporosis, h/o falls      Pain  Pain Assessment: 0-10  0-10 (Numeric) Pain Score: 2  Pain Location: Knee  Pain Orientation: Left, Right  Response to Interventions: post 2    Subjective  General  General Comment: Bad couple weeks at home. Hectic.  No falls or no partial falls    PT  Visit  Response to Previous Treatment: Patient with no complaints from previous session.    Objective  Shuffing gait, hurry cane    Treatment:  Therapeutic Exercise  Therapeutic Exercise Performed: Yes  Continue with DLS and core exercises, postural re-education, LE strengthening, transfer and gait training. Pt has had recent falls and lives alone. Goal is to keep patient living at home safely and pt wants to continue to be active in the community.      Exercises were paced and alteranted standing and sitting so to avoid fatigue.       Ankle pumps, HR 30x  Adduction rainbow ball 5s hold 20x  Abduction GTB 5s hold 20x  LAQ 1#, 15x  Standing heel/toe x15 each     Gentle HS  "Stretching 2 x30\"     //bars:    1 jennifer step over (center of // bars) 4 lengths, forward the side step    DNP due to time    Standing Mini march 15x  Side stepping 4 x 8 ft.  Heel  taps to 4\" step 2 x10 R/L  Forward, retro walk 2 x 8 ft        Current HEP:  Abdominal bracing  LAQ  AP    Billed Treatment Times:  Therapeutic Exercise 30 min    DNP due to time:  Balance/NMRE:      // bars:  Romberg stance  2 x30\" Without UE  Narrow FERNY 2 x 30\"  without UE    OP EDUCATION:       Assessment:  PT Assessment  Assessment Comment: Pt had some difficulty stepping over the one jennifer, initially because her feet were too far away from the jennifer. Improved after cues and instructed to walk up closer to the jennifer  Areas of improvements:  Decreased pain and Improved strength  Limitations/deficits:  Weakness and unsteady, LE weakness    Plan:     Continue with current POC/no changes    Assessment of current progress against goals:  Progressing toward functional goals    Goals:  Active       PT Problem - generalized weakness       PT Goal 1 - STG       Start:  09/25/24    Expected End:  11/09/24       1.  Improve LE AROM to WFL at deficits  2.  Improve LE strength to 4/5 grade at deficits  3.  Improve bilateral hamstring length to 90%  WFL  4.  Improve trunk flexibility to 90% of WFL  5.  Improve trunk strength to Good- or better  6.  Functional Outcome Measure:  50 (62%)               PT Goal 2 - LTG       Start:  09/25/24    Expected End:  12/24/24       LT FUNCTIONAL GOALS  1. Pt reports no falls indoors or outdoors  2. Pt reports able to transfer from a chair in the community without assistance 80% of the time  3. Pt demonstrates improved gait pattern to safely negotiate stairs at home, in the community and on level surfaces.    4. Pt reports less difficulty 50% of the time with car transfers              Patient Stated Goal 1       Start:  09/25/24    Expected End:  12/24/24       Able to get up from chairs in community (she " has been needing assistance at times because not all the chairs have arm rests)    Walk better

## 2024-11-12 ENCOUNTER — TREATMENT (OUTPATIENT)
Dept: PHYSICAL THERAPY | Facility: CLINIC | Age: 89
End: 2024-11-12
Payer: MEDICARE

## 2024-11-12 DIAGNOSIS — Z99.89 DEPENDENCE ON OTHER ENABLING MACHINES AND DEVICES: ICD-10-CM

## 2024-11-12 DIAGNOSIS — M25.562 PAIN IN LEFT KNEE: ICD-10-CM

## 2024-11-12 DIAGNOSIS — M25.561 PAIN IN RIGHT KNEE: ICD-10-CM

## 2024-11-12 DIAGNOSIS — R26.89 OTHER ABNORMALITIES OF GAIT AND MOBILITY: ICD-10-CM

## 2024-11-12 DIAGNOSIS — M54.50 LOW BACK PAIN, UNSPECIFIED: ICD-10-CM

## 2024-11-12 DIAGNOSIS — G89.29 OTHER CHRONIC PAIN: ICD-10-CM

## 2024-11-12 PROCEDURE — 97110 THERAPEUTIC EXERCISES: CPT | Mod: GP | Performed by: PHYSICAL THERAPIST

## 2024-11-12 ASSESSMENT — PAIN SCALES - GENERAL: PAINLEVEL_OUTOF10: 2

## 2024-11-12 ASSESSMENT — PAIN - FUNCTIONAL ASSESSMENT: PAIN_FUNCTIONAL_ASSESSMENT: 0-10

## 2024-11-13 NOTE — PROGRESS NOTES
Physical Therapy Treatment    Patient Name: Michela Mckee  MRN: [id number]  Encounter date:  11/14/2024  Time Calculation  Start Time: 1015  Stop Time: 1058  Time Calculation (min): 43 min     PT Therapeutic Procedures Time Entry  Neuromuscular Re-Education Time Entry: 11  Therapeutic Exercise Time Entry: 29    Visit Number:  6 (including evaluation)  Planned total visits: 12  Visit Authorized/insurance coverage:  NO AUTH, 30.00 CO PAY, 100% COVERAGE, 3000 OOP-NOT MET, MMO   Progress Report due visit #10        Current Problem  Problem List Items Addressed This Visit    None  Visit Diagnoses         Codes    Low back pain, unspecified     M54.50    Other chronic pain     G89.29    Pain in right knee     M25.561    Pain in left knee     M25.562    Other abnormalities of gait and mobility     R26.89    Dependence on other enabling machines and devices     Z99.89                 Precautions  Precautions  Precautions Comment: Osteoporosis, h/o falls      Pain  Pain Assessment: 0-10  0-10 (Numeric) Pain Score: 4  Pain Location: Knee  Pain Orientation: Left    Subjective  General   Patient reports that she was unable to sleep last night secondary to back pain and used the heating chair for 2 hrs. Which relieved it.  Her pain is higher in the R Knee today than usual per patient.        Objective  Used gait belt for standing tasks today secondary to patient somewhat unsteady upon arrival to visit.     Treatment:  Therapeutic Exercise  Therapeutic Exercise Performed: Yes  Continue with DLS and core exercises, postural re-education, LE strengthening, transfer and gait training. Pt has had recent falls and lives alone. Goal is to keep patient living at home safely and pt wants to continue to be active in the community.      Exercises were paced and alteranted standing and sitting so to avoid fatigue.       Ankle pumps, HR 30x  Adduction rainbow ball 5s hold 20x  Abduction GTB 5s hold 20x  LAQ 1#, 15x  Standing heel/toe  "x15 each     Gentle HS Stretching 2 x30\"     //bars:     1 jennifer step over (center of // bars) 4 lengths, forward, backward and then side step x5 each     DNP due to time     Standing Mini march 15x  Side stepping 4 x 8 ft.  Heel  taps to 4\" step 2 x10 R/L  DNP  Forward, retro walk 2 x 8 ft    Current HEP:  Abdominal bracing  LAQ  AP    Therapeutic Exercise 29 min      Balance/NMRE:   // bars:  Romberg stance  2 x30\" Without UE  Narrow FERNY 2 x 30\"  without UE  Billed Treatment Times: 11      OP EDUCATION:       Assessment:   Discussed use of walker that the patient reporting having on the days when she feels greater instability but she notes that she is using one to prop a door shut and the other is is the garage so it is not feasible.  She had greater difficulty stepping over the jennifer forward with her L>R foot.  She was able to step backward over the jennifer with her B feet better than forward.  Side stepping was good.  Areas of improvements:  Decreased pain and Improved strength  Limitations/deficits:   LE weakness and unsteadiness    Pain end of session:  2/10 R knee    Plan:     Continue with current POC/no changes    Assessment of current progress against goals:  Progressing toward functional goals    Goals:  Active       PT Problem - generalized weakness       PT Goal 1 - STG       Start:  09/25/24    Expected End:  11/09/24       1.  Improve LE AROM to WFL at deficits  2.  Improve LE strength to 4/5 grade at deficits  3.  Improve bilateral hamstring length to 90%  WFL  4.  Improve trunk flexibility to 90% of WFL  5.  Improve trunk strength to Good- or better  6.  Functional Outcome Measure:  50 (62%)               PT Goal 2 - LTG       Start:  09/25/24    Expected End:  12/24/24       LT FUNCTIONAL GOALS  1. Pt reports no falls indoors or outdoors  2. Pt reports able to transfer from a chair in the community without assistance 80% of the time  3. Pt demonstrates improved gait pattern to safely negotiate " stairs at home, in the community and on level surfaces.    4. Pt reports less difficulty 50% of the time with car transfers              Patient Stated Goal 1       Start:  09/25/24    Expected End:  12/24/24       Able to get up from chairs in community (she has been needing assistance at times because not all the chairs have arm rests)    Walk better

## 2024-11-14 ENCOUNTER — TREATMENT (OUTPATIENT)
Dept: PHYSICAL THERAPY | Facility: CLINIC | Age: 89
End: 2024-11-14
Payer: MEDICARE

## 2024-11-14 DIAGNOSIS — M25.561 PAIN IN RIGHT KNEE: ICD-10-CM

## 2024-11-14 DIAGNOSIS — M25.562 PAIN IN LEFT KNEE: ICD-10-CM

## 2024-11-14 DIAGNOSIS — R26.89 OTHER ABNORMALITIES OF GAIT AND MOBILITY: ICD-10-CM

## 2024-11-14 DIAGNOSIS — M54.50 LOW BACK PAIN, UNSPECIFIED: ICD-10-CM

## 2024-11-14 DIAGNOSIS — G89.29 OTHER CHRONIC PAIN: ICD-10-CM

## 2024-11-14 DIAGNOSIS — Z99.89 DEPENDENCE ON OTHER ENABLING MACHINES AND DEVICES: ICD-10-CM

## 2024-11-14 PROCEDURE — 97112 NEUROMUSCULAR REEDUCATION: CPT | Mod: GP,CQ

## 2024-11-14 PROCEDURE — 97110 THERAPEUTIC EXERCISES: CPT | Mod: GP,CQ

## 2024-11-14 ASSESSMENT — PAIN SCALES - GENERAL: PAINLEVEL_OUTOF10: 4

## 2024-11-14 ASSESSMENT — PAIN - FUNCTIONAL ASSESSMENT: PAIN_FUNCTIONAL_ASSESSMENT: 0-10

## 2024-11-15 NOTE — PROGRESS NOTES
"    Physical Therapy Treatment    Patient Name: Michela Mckee  MRN: [id number]  Encounter date:  11/19/2024  Time Calculation  Start Time: 1045  Stop Time: 1130  Time Calculation (min): 45 min     PT Therapeutic Procedures Time Entry  Therapeutic Exercise Time Entry: 42    Visit Number:  7 (including evaluation)  Planned total visits: 12  Visit Authorized/insurance coverage:  NO AUTH, 30.00 CO PAY, 100% COVERAGE, 3000 OOP-NOT MET, MMO   Progress Report due visit #10    Current Problem  Problem List Items Addressed This Visit    None  Visit Diagnoses         Codes    Low back pain, unspecified     M54.50    Other chronic pain     G89.29    Pain in right knee     M25.561    Pain in left knee     M25.562    Other abnormalities of gait and mobility     R26.89    Dependence on other enabling machines and devices     Z99.89           Precautions  Precautions  Precautions Comment: Osteoporosis, h/o falls      Pain  Pain Assessment: 0-10  0-10 (Numeric) Pain Score: 4  Pain Location: Knee  Response to Interventions: Decrease in pain (2)    Subjective  General       PT  Visit  Response to Previous Treatment: Patient with no complaints from previous session.    Objective  Pt presented with more appropriate shoes but was unable to tie them.  PT tied her shoes and recommend to bring her shoes in a bag and we can help her tie them when she get here.  If carrying is a problem, I will hold them at my desk for her.      Treatment:  Therapeutic Exercise  Therapeutic Exercise Performed: Yes  Continue with DLS and core exercises, postural re-education, LE strengthening, transfer and gait training. Pt has had recent falls and lives alone. Goal is to keep patient living at home safely and pt wants to continue to be active in the community.      Exercises were paced and alteranted standing and sitting so to avoid fatigue.      Nustep, L1, 6 min    4\" step up 10, L, brief seated rest 10x R 2\" step with PT stabilizing step, min A    Focus " "was more strengthening  //bars:  Standing heel/toe x15 each  Standing Mini march 4 laps  Side stepping 4 x 8 ft.  Heel  taps to 4\" step 2 x10 R/L  DNP  Forward, retro walk 4 laps    4 hurdles, pt didn't clear the 2nd jennifer until they hurdles were well .       Current HEP:  Abdominal bracing  LAQ  AP       Billed Treatment Times:  Therapeutic Exercise 42 min    OP EDUCATION:       Assessment:  PT Assessment  Assessment Comment: Decreased knee pain after Nustep.  Not able to step up onto 4\" step leading with right leg due to pain. Lowered step to 2\" and pt did fine.  She continues to need CGA for steps and activities in // bars  Areas of improvements:  Decreased pain and Improved strength  Limitations/deficits:  Weakness, Unstable, and right knee pain    Plan:     Continue with current POC/no changes    Assessment of current progress against goals:  Progressing toward functional goals    Goals:  Active       PT Problem - generalized weakness       PT Goal 1 - STG       Start:  09/25/24    Expected End:  11/09/24       1.  Improve LE AROM to WFL at deficits  2.  Improve LE strength to 4/5 grade at deficits  3.  Improve bilateral hamstring length to 90%  WFL  4.  Improve trunk flexibility to 90% of WFL  5.  Improve trunk strength to Good- or better  6.  Functional Outcome Measure:  50 (62%)               PT Goal 2 - LTG       Start:  09/25/24    Expected End:  12/24/24       LT FUNCTIONAL GOALS  1. Pt reports no falls indoors or outdoors  2. Pt reports able to transfer from a chair in the community without assistance 80% of the time  3. Pt demonstrates improved gait pattern to safely negotiate stairs at home, in the community and on level surfaces.    4. Pt reports less difficulty 50% of the time with car transfers              Patient Stated Goal 1       Start:  09/25/24    Expected End:  12/24/24       Able to get up from chairs in community (she has been needing assistance at times because not all the " chairs have arm rests)    Walk better

## 2024-11-18 ENCOUNTER — TELEPHONE (OUTPATIENT)
Dept: PRIMARY CARE | Facility: CLINIC | Age: 89
End: 2024-11-18
Payer: MEDICARE

## 2024-11-18 NOTE — TELEPHONE ENCOUNTER
Patients daughter called wanting to know if we have record of her having the rsv injection in 2023 which I do show in here. Also she wants to know if she can come in on Thursday for a sick visit because the daughter will be in town and over the weekend the patient had an incident where she was stuck on the toilet for over 4 hours and now she is having back pain.  
160

## 2024-11-19 ENCOUNTER — TREATMENT (OUTPATIENT)
Dept: PHYSICAL THERAPY | Facility: CLINIC | Age: 89
End: 2024-11-19
Payer: MEDICARE

## 2024-11-19 DIAGNOSIS — G89.29 CHRONIC BILATERAL LOW BACK PAIN WITHOUT SCIATICA: Primary | ICD-10-CM

## 2024-11-19 DIAGNOSIS — G89.29 OTHER CHRONIC PAIN: ICD-10-CM

## 2024-11-19 DIAGNOSIS — M54.50 CHRONIC BILATERAL LOW BACK PAIN WITHOUT SCIATICA: Primary | ICD-10-CM

## 2024-11-19 DIAGNOSIS — Z99.89 DEPENDENCE ON OTHER ENABLING MACHINES AND DEVICES: ICD-10-CM

## 2024-11-19 DIAGNOSIS — R26.89 OTHER ABNORMALITIES OF GAIT AND MOBILITY: ICD-10-CM

## 2024-11-19 DIAGNOSIS — M25.561 PAIN IN RIGHT KNEE: ICD-10-CM

## 2024-11-19 DIAGNOSIS — M25.562 PAIN IN LEFT KNEE: ICD-10-CM

## 2024-11-19 DIAGNOSIS — M54.50 LOW BACK PAIN, UNSPECIFIED: ICD-10-CM

## 2024-11-19 PROCEDURE — 97110 THERAPEUTIC EXERCISES: CPT | Mod: GP | Performed by: PHYSICAL THERAPIST

## 2024-11-19 ASSESSMENT — PAIN - FUNCTIONAL ASSESSMENT: PAIN_FUNCTIONAL_ASSESSMENT: 0-10

## 2024-11-19 ASSESSMENT — PAIN SCALES - GENERAL: PAINLEVEL_OUTOF10: 4

## 2024-11-20 NOTE — PROGRESS NOTES
"    Physical Therapy Treatment    Patient Name: Michela Mckee  MRN: [id number]  Encounter date:  11/21/2024  Time Calculation  Start Time: 1100  Stop Time: 1146  Time Calculation (min): 46 min     PT Therapeutic Procedures Time Entry  Therapeutic Exercise Time Entry: 42    Visit Number:  8 (including evaluation)  Planned total visits: 12  Visit Authorized/insurance coverage:  NO AUTH, 30.00 CO PAY, 100% COVERAGE, 3000 OOP-NOT MET, MMO   Progress Report due visit #10        Current Problem  Problem List Items Addressed This Visit    None  Visit Diagnoses         Codes    Low back pain, unspecified     M54.50    Other chronic pain     G89.29    Pain in right knee     M25.561    Pain in left knee     M25.562    Other abnormalities of gait and mobility     R26.89    Dependence on other enabling machines and devices     Z99.89          Precautions  Precautions  Precautions Comment: Osteoporosis, h/o falls      Pain  Pain Assessment: 0-10  0-10 (Numeric) Pain Score: 2  Pain Location: Knee  Response to Interventions: Decrease in pain 0/10    Subjective  General   Patient reports that she had a fall after last therapy session where she was bringing a package indoors and the door slammed into her and knocked her down on her knees.  She reports feeling ok now with the exception of some soreness in her UE from catching herself.          Objective  Tolerated increased strengthening, improved fatigue    Treatment:  Therapeutic Exercise  Therapeutic Exercise Performed: YesContinue with DLS and core exercises, postural re-education, LE strengthening, transfer and gait training. Pt has had recent falls and lives alone. Goal is to keep patient living at home safely and pt wants to continue to be active in the community.      Exercises were paced and alteranted standing and sitting so to avoid fatigue.       Nustep, L1, 6 min     4\" step up 10, L, brief seated rest DNP secondary to R pain   R/L  2\" step with PT stabilizing step, " "min A x10      Focus was more strengthening  //bars:  Standing heel/toe x15 each  Standing Mini march 4 laps  Side stepping 4 x 8 ft.  Hip Abduction x10  Hip Extension toe taps x10  Hip SLR x10  Heel  taps to 4\" step 2 x10 R/L  DNP  Forward, retro walk 4 laps     4 hurdles, pt didn't clear the 2nd jennifer until they hurdles were well . DNP         Current HEP:  Abdominal bracing  LAQ  AP    Billed Treatment Times:  Therapeutic Exercise 42 min        OP EDUCATION:       Assessment:  Patient progressed well today with the addition of strengthening exercises and endurance requiring very little rest breaks today.  She has some discomfort with step ups in R knee.  She displayed greater stability while performing standing exercises overall.   Areas of improvements:  Decreased pain and Improved stability  Limitations/deficits:  Weakness, Unstable, and right knee pain    Pain end of session:  0 sore, not pain    Plan:     Continue with current POC/no changes    Assessment of current progress against goals:  Progressing toward functional goals    Goals:  Active       PT Problem - generalized weakness       PT Goal 1 - STG       Start:  09/25/24    Expected End:  11/09/24       1.  Improve LE AROM to WFL at deficits  2.  Improve LE strength to 4/5 grade at deficits  3.  Improve bilateral hamstring length to 90%  WFL  4.  Improve trunk flexibility to 90% of WFL  5.  Improve trunk strength to Good- or better  6.  Functional Outcome Measure:  50 (62%)               PT Goal 2 - LTG       Start:  09/25/24    Expected End:  12/24/24       LT FUNCTIONAL GOALS  1. Pt reports no falls indoors or outdoors  2. Pt reports able to transfer from a chair in the community without assistance 80% of the time  3. Pt demonstrates improved gait pattern to safely negotiate stairs at home, in the community and on level surfaces.    4. Pt reports less difficulty 50% of the time with car transfers              Patient Stated Goal 1       " Start:  09/25/24    Expected End:  12/24/24       Able to get up from chairs in community (she has been needing assistance at times because not all the chairs have arm rests)    Walk better

## 2024-11-21 ENCOUNTER — OFFICE VISIT (OUTPATIENT)
Dept: PRIMARY CARE | Facility: CLINIC | Age: 89
End: 2024-11-21
Payer: MEDICARE

## 2024-11-21 ENCOUNTER — TREATMENT (OUTPATIENT)
Dept: PHYSICAL THERAPY | Facility: CLINIC | Age: 89
End: 2024-11-21
Payer: MEDICARE

## 2024-11-21 VITALS — HEART RATE: 68 BPM | SYSTOLIC BLOOD PRESSURE: 116 MMHG | OXYGEN SATURATION: 98 % | DIASTOLIC BLOOD PRESSURE: 72 MMHG

## 2024-11-21 DIAGNOSIS — R26.89 OTHER ABNORMALITIES OF GAIT AND MOBILITY: ICD-10-CM

## 2024-11-21 DIAGNOSIS — M54.50 LOW BACK PAIN, UNSPECIFIED: ICD-10-CM

## 2024-11-21 DIAGNOSIS — N30.01 ACUTE CYSTITIS WITH HEMATURIA: ICD-10-CM

## 2024-11-21 DIAGNOSIS — M25.561 PAIN IN RIGHT KNEE: ICD-10-CM

## 2024-11-21 DIAGNOSIS — G89.29 OTHER CHRONIC PAIN: ICD-10-CM

## 2024-11-21 DIAGNOSIS — Z99.89 DEPENDENCE ON OTHER ENABLING MACHINES AND DEVICES: ICD-10-CM

## 2024-11-21 DIAGNOSIS — R60.0 PERIPHERAL EDEMA: ICD-10-CM

## 2024-11-21 DIAGNOSIS — M25.562 PAIN IN LEFT KNEE: ICD-10-CM

## 2024-11-21 DIAGNOSIS — R10.9 RIGHT FLANK PAIN: Primary | ICD-10-CM

## 2024-11-21 LAB
POC APPEARANCE, URINE: CLEAR
POC BILIRUBIN, URINE: NEGATIVE
POC BLOOD, URINE: ABNORMAL
POC COLOR, URINE: YELLOW
POC GLUCOSE, URINE: NEGATIVE MG/DL
POC KETONES, URINE: NEGATIVE MG/DL
POC LEUKOCYTES, URINE: ABNORMAL
POC NITRITE,URINE: NEGATIVE
POC PH, URINE: 6 PH
POC PROTEIN, URINE: ABNORMAL MG/DL
POC SPECIFIC GRAVITY, URINE: 1.02
POC UROBILINOGEN, URINE: 1 EU/DL

## 2024-11-21 PROCEDURE — 97110 THERAPEUTIC EXERCISES: CPT | Mod: GP,CQ

## 2024-11-21 PROCEDURE — 99214 OFFICE O/P EST MOD 30 MIN: CPT | Performed by: FAMILY MEDICINE

## 2024-11-21 PROCEDURE — 1123F ACP DISCUSS/DSCN MKR DOCD: CPT | Performed by: FAMILY MEDICINE

## 2024-11-21 PROCEDURE — 81003 URINALYSIS AUTO W/O SCOPE: CPT | Performed by: FAMILY MEDICINE

## 2024-11-21 PROCEDURE — 1157F ADVNC CARE PLAN IN RCRD: CPT | Performed by: FAMILY MEDICINE

## 2024-11-21 PROCEDURE — 1159F MED LIST DOCD IN RCRD: CPT | Performed by: FAMILY MEDICINE

## 2024-11-21 PROCEDURE — 1036F TOBACCO NON-USER: CPT | Performed by: FAMILY MEDICINE

## 2024-11-21 PROCEDURE — 87086 URINE CULTURE/COLONY COUNT: CPT

## 2024-11-21 RX ORDER — FUROSEMIDE 20 MG/1
20 TABLET ORAL DAILY PRN
Qty: 20 TABLET | Refills: 0 | Status: SHIPPED | OUTPATIENT
Start: 2024-11-21 | End: 2024-12-11

## 2024-11-21 RX ORDER — SULFAMETHOXAZOLE AND TRIMETHOPRIM 800; 160 MG/1; MG/1
1 TABLET ORAL 2 TIMES DAILY
Qty: 14 TABLET | Refills: 0 | Status: SHIPPED | OUTPATIENT
Start: 2024-11-21 | End: 2024-11-28

## 2024-11-21 ASSESSMENT — PAIN SCALES - GENERAL: PAINLEVEL_OUTOF10: 2

## 2024-11-21 ASSESSMENT — PAIN - FUNCTIONAL ASSESSMENT: PAIN_FUNCTIONAL_ASSESSMENT: 0-10

## 2024-11-21 NOTE — PATIENT INSTRUCTIONS
It sounds like you had a kidney stone  We will check the urine for infection and/or blood  Stay well hydrated   If the pain comes back . Let me know we can order a CT scan to check for a stone     Refills of the lasix to take in the AM for 3 days   Continue the compression treatment of the legs     Keep taking the vitamin D supplement at 2000 international units of D3 per day over the counter.    Labs on 3/14/24- Sugar was a little elevated which we can monitor. Normal liver and kidney function. Cholesterol levels look stable. Make sure to stay hydrated. Normal blood counts, no evidence of anemia or infection. B12 is high so please hold/stop any B vitamin supplements. Normal thyroid and vitamin D levels.     For the right knee pain, you could try more PT or again see Ortho for injection.     Please monitor blood pressure at home. BP is good today at 128/70.   Goal is less than 130/80.     Continue the lymphedema pumps   For the leg swelling, consider getting a chair to get the legs elevated.     Also try the voltaren (diclofenac) gel to rub into the hands, you can follow up with the hand specialist as needed, glad the hand procedure went well    Continue follow up with dermatologist for cancer checks.     DEXA was on 10/31/23-Bone density showed persistence of the osteopenia, this is good. We can repeat in 2-3 years if she would like.     Follow up as scheduled or sooner as needed

## 2024-11-21 NOTE — ASSESSMENT & PLAN NOTE
Orders:    furosemide (Lasix) 20 mg tablet; Take 1 tablet (20 mg) by mouth once daily as needed (leg swelling) for up to 20 days.
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
## 2024-11-21 NOTE — PROGRESS NOTES
Physical Therapy Treatment    Patient Name: Michela Mckee  MRN: [id number]  Encounter date:  11/26/2024  Time Calculation  Start Time: 1131  Stop Time: 1214  Time Calculation (min): 43 min     PT Therapeutic Procedures Time Entry  Therapeutic Exercise Time Entry: 40    Visit Number:  9 (including evaluation)  Planned total visits: 12  Visit Authorized/insurance coverage:  NO AUTH, 30.00 CO PAY, 100% COVERAGE, 3000 OOP-NOT MET, MMO   Progress Report due visit #10      Current Problem  Problem List Items Addressed This Visit    None  Visit Diagnoses         Codes    Low back pain, unspecified     M54.50    Other chronic pain     G89.29    Pain in right knee     M25.561    Pain in left knee     M25.562    Other abnormalities of gait and mobility     R26.89    Dependence on other enabling machines and devices     Z99.89           Precautions  Precautions  Precautions Comment: Osteoporosis, h/o falls      Pain  0-10 (Numeric) Pain Score: 2  Response to Interventions: No change in pain    Subjective  General  General Comment: no falls    PT  Visit  Response to Previous Treatment: Patient with no complaints from previous session.    Objective  Improved control and heel strike but continues to demonstrate decreased hip flexion and step length.    Treatment:  Therapeutic Exercise  Therapeutic Exercise Performed: Yes  Therapeutic Exercise Performed: Yes    Pt did not have her better shoes on because she couldn't get them on.    Continue with DLS and core exercises, postural re-education, LE strengthening, transfer and gait training. Pt has had recent falls and lives alone. Goal is to keep patient living at home safely and pt wants to continue to be active in the community.      Exercises were paced and alteranted standing and sitting so to avoid fatigue.       Nustep, L1, 6 min    Focus was more strengthening  Plinth:  Standing heel/toe x15 each  Ambulate around plinth, 1 UE A, PT SBA    //bars  Standing Mini march 4  "laps  Side stepping 4 x 8 ft.  Hip Abduction x10  Hip Extension toe taps x10  Hip SLR x10  Heel  taps to 4\" step 2 x10 R/L  DNP  Forward, retro walk 4 CGA    Seated HS stretch 3x30  LAQ 10X       4\" step up 10, L, brief seated rest DNP secondary to R pain   R/L  2\" step with PT stabilizing step, min A x10      4 hurdles, pt didn't clear the 2nd jennifer until they hurdles were well . DNP       Current HEP:  Abdominal bracing  LAQ  AP    Billed Treatment Times:  Therapeutic Exercise 40 min    OP EDUCATION:       Assessment:  PT Assessment  Assessment Comment: Pt needed more seated rest breaks today.  Short duration demonstrated decrased tolerance for standing YOUSUF  Areas of improvements:  Decreased pain and Improved strength  Limitations/deficits:  Weakness and Unstable    Plan:     Continue with current POC/no changes    Assessment of current progress against goals:  Progressing toward functional goals    Goals:  Active       PT Problem - generalized weakness       PT Goal 1 - STG       Start:  09/25/24    Expected End:  11/09/24       1.  Improve LE AROM to WFL at deficits  2.  Improve LE strength to 4/5 grade at deficits  3.  Improve bilateral hamstring length to 90%  WFL  4.  Improve trunk flexibility to 90% of WFL  5.  Improve trunk strength to Good- or better  6.  Functional Outcome Measure:  50 (62%)               PT Goal 2 - LTG       Start:  09/25/24    Expected End:  12/24/24       LT FUNCTIONAL GOALS  1. Pt reports no falls indoors or outdoors  2. Pt reports able to transfer from a chair in the community without assistance 80% of the time  3. Pt demonstrates improved gait pattern to safely negotiate stairs at home, in the community and on level surfaces.    4. Pt reports less difficulty 50% of the time with car transfers              Patient Stated Goal 1       Start:  09/25/24    Expected End:  12/24/24       Able to get up from chairs in community (she has been needing assistance at times because " not all the chairs have arm rests)    Walk better

## 2024-11-23 LAB — BACTERIA UR CULT: NORMAL

## 2024-11-26 ENCOUNTER — TREATMENT (OUTPATIENT)
Dept: PHYSICAL THERAPY | Facility: CLINIC | Age: 89
End: 2024-11-26
Payer: MEDICARE

## 2024-11-26 DIAGNOSIS — G89.29 OTHER CHRONIC PAIN: ICD-10-CM

## 2024-11-26 DIAGNOSIS — R26.89 OTHER ABNORMALITIES OF GAIT AND MOBILITY: ICD-10-CM

## 2024-11-26 DIAGNOSIS — N30.01 ACUTE CYSTITIS WITH HEMATURIA: Primary | ICD-10-CM

## 2024-11-26 DIAGNOSIS — M25.561 PAIN IN RIGHT KNEE: ICD-10-CM

## 2024-11-26 DIAGNOSIS — M54.50 LOW BACK PAIN, UNSPECIFIED: ICD-10-CM

## 2024-11-26 DIAGNOSIS — M25.562 PAIN IN LEFT KNEE: ICD-10-CM

## 2024-11-26 DIAGNOSIS — Z99.89 DEPENDENCE ON OTHER ENABLING MACHINES AND DEVICES: ICD-10-CM

## 2024-11-26 PROCEDURE — 97110 THERAPEUTIC EXERCISES: CPT | Mod: GP | Performed by: PHYSICAL THERAPIST

## 2024-11-26 ASSESSMENT — PAIN SCALES - GENERAL: PAINLEVEL_OUTOF10: 2

## 2024-11-27 NOTE — PROGRESS NOTES
Physical Therapy Progress Report / Treatment    Patient Name: Michela Mckee  MRN: [id number]  Encounter date:  12/3/2024             Visit Number:  10 (including evaluation)  Planned total visits: 12  Visit Authorized/insurance coverage:  NO AUTH, 30.00 CO PAY, 100% COVERAGE, 3000 OOP-NOT MET, MMO   Progress Report due visit #10    Current Problem  Problem List Items Addressed This Visit    None     Precautions         Pain       Subjective  General            Objective  ROM and Strength:     Lumbar:       See below     Lumbar AROM STRENGTH     Flexion 40% limited poor    Extension Neutral  poor    /////////////////////////////////////////////////////////////      Hip:     See below                 AROM STRENGTH   Hip R L R L   Hip Flexion 50% limited 50% limited 3-/5 3-/5   Hip Abduction WFL WFL 3-/5 3-/5   Hip Adduction WFL WFL 3-/5 3-/5      Knee:     See below              AROM STRENGTH   Knee R L R L   Knee Flexion WFL WFL 3-/5 3-/5   Knee Extension WFL WFL 3/5 3/5         Flexibility:        R  L   Pectoralis tight tight   Hamstring tight tight   Gastroc/Soleus tight tight      Gait:  Gait Comment: SPC jhurry cane, decreased nir, decreased step length, decreased heel strike, stiff gait slightly wider FERNY, pt very cautious     Transfers:  Transfers Comment: Needs a couple attempts to move from sit to stand.  Pt procedes cauitiously.  Unable to transfer without UE A     Outcome Measures:  Other Measures  Lower Extremity Funtional Score (LEFS): 24/80 (30%); 12/03/2024 ***    Treatment:  {PT Treatments:46948}  Pt did not have her better shoes on because she couldn't get them on.     Continue with DLS and core exercises, postural re-education, LE strengthening, transfer and gait training. Pt has had recent falls and lives alone. Goal is to keep patient living at home safely and pt wants to continue to be active in the community.      Exercises were paced and alteranted standing and sitting so to avoid  "fatigue.       Nustep, L1, 6 min     Focus was more strengthening  Plinth:  Standing heel/toe x15 each  Ambulate around plinth, 1 UE A, PT SBA     //bars  Standing Mini march 4 laps  Side stepping 4 x 8 ft.  Hip Abduction x10  Hip Extension toe taps x10  Hip SLR x10  Heel  taps to 4\" step 2 x10 R/L  DNP  Forward, retro walk 4 CGA     Seated HS stretch 3x30  LAQ 10X        4\" step up 10, L, brief seated rest DNP secondary to R pain   R/L  2\" step with PT stabilizing step, min A x10      4 hurdles, pt didn't clear the 2nd jennifer until they hurdles were well . DNP        Current HEP:  Abdominal bracing  LAQ  AP    Current HEP:  ***    Billed Treatment Times:  {Treatment times:19265}    {PT Treatments:11638}  Manual:    ***  Billed Treatment Times:  {Treatment times:75547}      {PT Treatments:92721}  Balance/NMRE:     ***  Billed Treatment Times:  {Treatment times:08968}    {PT Treatments:98576}  Therapeutic Activity:  ***  Billed Treatment Times:  {Treatment times:25712}    OP EDUCATION:       Assessment:     Areas of improvements:  {basassessmentimprovements:05739}  Limitations/deficits:  {basassessmentlimitations/deficits:95076}    Pain end of session:  ***    Plan:     {BASPLAN:47639}    Assessment of current progress against goals:  {BASPTNOTEGOALASSESSMENT:35164}    Goals:  Active       PT Problem - generalized weakness       PT Goal 1 - STG       Start:  09/25/24    Expected End:  11/09/24       1.  Improve LE AROM to WFL at deficits  2.  Improve LE strength to 4/5 grade at deficits  3.  Improve bilateral hamstring length to 90%  WFL  4.  Improve trunk flexibility to 90% of WFL  5.  Improve trunk strength to Good- or better  6.  Functional Outcome Measure:  50 (62%)               PT Goal 2 - LTG       Start:  09/25/24    Expected End:  12/24/24       LT FUNCTIONAL GOALS  1. Pt reports no falls indoors or outdoors  2. Pt reports able to transfer from a chair in the community without assistance 80% of the " time  3. Pt demonstrates improved gait pattern to safely negotiate stairs at home, in the community and on level surfaces.    4. Pt reports less difficulty 50% of the time with car transfers              Patient Stated Goal 1       Start:  09/25/24    Expected End:  12/24/24       Able to get up from chairs in community (she has been needing assistance at times because not all the chairs have arm rests)    Walk better

## 2024-12-03 ENCOUNTER — APPOINTMENT (OUTPATIENT)
Dept: PHYSICAL THERAPY | Facility: CLINIC | Age: 89
End: 2024-12-03
Payer: MEDICARE

## 2024-12-06 NOTE — PROGRESS NOTES
Physical Therapy Progress Report / Treatment    Patient Name: Michela Mckee  MRN: [id number]  Encounter date:  12/10/2024  Time Calculation  Start Time: 1053  Stop Time: 1130  Time Calculation (min): 37 min     PT Therapeutic Procedures Time Entry  Therapeutic Exercise Time Entry: 30    Visit Number:  10 (including evaluation)  Planned total visits: 12  Visit Authorized/insurance coverage:  NO AUTH, 30.00 CO PAY, 100% COVERAGE, 3000 OOP-NOT MET, MMO   Progress Report due visit #10    Pt arrived late due to having difficulty finding a parking space.  She was accommodated with a shorter visit    Current Problem  Problem List Items Addressed This Visit    None  Visit Diagnoses         Codes    Low back pain, unspecified     M54.50    Other chronic pain     G89.29    Pain in right knee     M25.561    Pain in left knee     M25.562    Other abnormalities of gait and mobility     R26.89    Dependence on other enabling machines and devices     Z99.89           Precautions  Precautions  Precautions Comment: Osteoporosis, h/o falls      Pain  Pain Assessment: 0-10  0-10 (Numeric) Pain Score: 3 (Past week: 2-5)  Response to Interventions: Decrease in pain (2)    Subjective  General  General Comment: No falls. Pt feels the therapy is helping and the days she is here she does good at home. She is performing her HEP but feels the exerices in therapy are more beneficial (Needed help getting into a van.)    PT  Visit  Response to Previous Treatment: Patient with no complaints from previous session.    Objective  ROM and Strength:     Lumbar:       See below     Lumbar AROM STRENGTH     Flexion 40% limited poor    Extension Neutral  poor    /////////////////////////////////////////////////////////////      Hip:     See below                 AROM STRENGTH   Hip R L R L   Hip Flexion 25% limited 25% limited 4/5 4/5   Hip Abduction WFL WFL 4/5 4/5   Hip Adduction WFL WFL 4/5 4/5      Knee:     See below              AROM STRENGTH  "  Knee R L R L   Knee Flexion WFL WFL 4/5 4/5   Knee Extension WFL WFL 4/5 4/5         Gait:  Gait Comment: SPC estephania cane, improved nir but continues to be slow, decreased step length, decreased heel strike, stiff gait slightly wider FERNY, pt continues to be very cautious     Transfers:  Transfers Comment: Able to transfer with only one attempt when the chair has arms.   Needs assistance if she is not able to use her arms.      Outcome Measures:  Other Measures  Lower Extremity Funtional Score (LEFS): 24/80 (30%); 12/10.2024 LEFS 30/80    Treatment:  Therapeutic Exercise  Therapeutic Exercise Performed: Yes    Continue with DLS and core exercises, postural re-education, LE strengthening, transfer and gait training. Pt has had recent falls and lives alone. Goal is to keep patient living at home safely and pt wants to continue to be active in the community.     Exercises were paced and alteranted standing and sitting so to avoid fatigue.      Pt had tennis shoes on today and was able to tie them  Sit to stand:  25 second 5x    Seated HS stretch 3x30  LAQ 10X    //bars  Standing Mini march 4 laps  Side stepping 4 x 8 ft.  Hip Abduction x10  Hip Extension toe taps x10  Hip SLR x10  Heel  taps to 4\" step 2 x10 R/L  DNP  Forward, retro walk 4 CGA    DNP     Nustep, L1, 6 min     Focus was more strengthening  Plinth:  Standing heel/toe x15 each  Ambulate around plinth, 1 UE A, PT SBA    4\" step up 10, L, brief seated rest DNP secondary to R pain   R/L  2\" step with PT stabilizing step, min A x10      4 hurdles, pt didn't clear the 2nd jennifer until they hurdles were well . DNP        Current HEP:  Abdominal bracing  LAQ  AP  Standing YOUSUF    Billed Treatment Times:  Therapeutic Exercise 30 min    OP EDUCATION:       Assessment:  PT Assessment  Assessment Comment: Pt able to perform the //bar activities with less assistance and did well with SBA (Significant improvement in strength and improved functional " mobility.  Pt reports no falls at home.  Goals are all progressing with improvement in all areas.)  Areas of improvements:  Decreased pain, Improved stability, and Improved strength  Limitations/deficits:  Weakness, Unstable, and pain    Plan:     Continue with current POC/no changes    Assessment of current progress against goals:  Progressing toward functional goals    Goals:  Active       PT Problem - generalized weakness       PT Goal 1 - STG (Progressing)       Start:  09/25/24    Expected End:  01/24/25       1.  Improve LE AROM to WFL at deficits  2.  Improve LE strength to 4/5 grade at deficits  3.  Improve bilateral hamstring length to 90%  WFL  4.  Improve trunk flexibility to 90% of WFL  5.  Improve trunk strength to Good- or better  6.  Functional Outcome Measure:  50 (62%)               PT Goal 2 - LTG (Progressing)       Start:  09/25/24    Expected End:  03/10/25       LT FUNCTIONAL GOALS  1. Pt reports no falls indoors or outdoors - met  2. Pt reports able to transfer from a chair in the community without assistance 80% of the time - partially met, takes effort  3. Pt demonstrates improved gait pattern to safely negotiate stairs at home, in the community and on level surfaces - partially met, one at a time  4. Pt reports less difficulty 50% of the time with car transfers - partially met, improving             Patient Stated Goal 1 (Progressing)       Start:  09/25/24    Expected End:  03/10/25       30% improvement    Able to get up from chairs in community (she has been needing assistance at times because not all the chairs have arm rests)    Walk better

## 2024-12-10 ENCOUNTER — APPOINTMENT (OUTPATIENT)
Dept: PHYSICAL THERAPY | Facility: CLINIC | Age: 89
End: 2024-12-10
Payer: MEDICARE

## 2024-12-10 DIAGNOSIS — M25.562 PAIN IN LEFT KNEE: ICD-10-CM

## 2024-12-10 DIAGNOSIS — G89.29 OTHER CHRONIC PAIN: ICD-10-CM

## 2024-12-10 DIAGNOSIS — M54.50 LOW BACK PAIN, UNSPECIFIED: ICD-10-CM

## 2024-12-10 DIAGNOSIS — Z99.89 DEPENDENCE ON OTHER ENABLING MACHINES AND DEVICES: ICD-10-CM

## 2024-12-10 DIAGNOSIS — R26.89 OTHER ABNORMALITIES OF GAIT AND MOBILITY: ICD-10-CM

## 2024-12-10 DIAGNOSIS — M25.561 PAIN IN RIGHT KNEE: ICD-10-CM

## 2024-12-10 PROCEDURE — 97110 THERAPEUTIC EXERCISES: CPT | Mod: GP | Performed by: PHYSICAL THERAPIST

## 2024-12-10 ASSESSMENT — PAIN - FUNCTIONAL ASSESSMENT: PAIN_FUNCTIONAL_ASSESSMENT: 0-10

## 2024-12-10 ASSESSMENT — PAIN SCALES - GENERAL: PAINLEVEL_OUTOF10: 3

## 2024-12-20 NOTE — PROGRESS NOTES
Physical Therapy Treatment    Patient Name: Michela Mckee  MRN: [id number]  Encounter date:  12/24/2024  Time Calculation  Start Time: 1145  Stop Time: 1218  Time Calculation (min): 33 min     PT Therapeutic Procedures Time Entry  Therapeutic Exercise Time Entry: 30    Visit Number:  11 (including evaluation)  Planned total visits: 20  Visit Authorized/insurance coverage:  NO AUTH, 30.00 CO PAY, 100% COVERAGE, 3000 OOP-NOT MET, MMO   Progress Report due visit #20  Pt wishes to continue        Current Problem  Problem List Items Addressed This Visit    None  Visit Diagnoses         Codes    Low back pain, unspecified     M54.50    Other chronic pain     G89.29    Pain in right knee     M25.561    Pain in left knee     M25.562    Other abnormalities of gait and mobility     R26.89    Dependence on other enabling machines and devices     Z99.89           Precautions  Precautions  Precautions Comment: Osteoporosis, h/o falls      Pain  Pain Assessment: 0-10  0-10 (Numeric) Pain Score: 8  Response to Interventions: Decrease in pain    Subjective  General  General Comment: Just her knees hurt today    PT  Visit  Response to Previous Treatment: Patient with no complaints from previous session.    Objective  Improved steadiness without SPC, improved nir    Treatment:  Therapeutic Exercise  Therapeutic Exercise Performed: Yes  Continue with DLS and core exercises, postural re-education, LE strengthening, transfer and gait training. Pt has had recent falls and lives alone. Goal is to keep patient living at home safely and pt wants to continue to be active in the community.      Exercises were paced and alteranted standing and sitting so to avoid fatigue.       Pt had tennis shoes on today and was able to tie them    Nustep:  UE/LE 4 min, L2    Sit to stand:  25 second 5x DNP due to her knees     Seated HS stretch 3x30  LAQ 2x12    //bars  Standing Mini march 4 laps  Side stepping 4 x 8 ft.    Aeromat beam: CGA and  "gait belt  Forward walking 8 lengths  Side step 8 lengths    3 hurdles, 4 laps, knocked over one jennifer times.  Corrected her movement pattern to lead with the foot that caught on the hurdles and pt performed fine.    DNP due to time    Hip Abduction x10  Hip Extension toe taps x10  Hip SLR x10  Heel  taps to 4\" step 2 x10 R/L  DNP  Forward, retro walk 4 CGA    Nustep, L1, 6 min     Focus was more strengthening  Plinth:  Standing heel/toe x15 each  Ambulate around plinth, 1 UE A, PT SBA     4\" step up 10, L, brief seated rest DNP secondary to R pain   R/L  2\" step with PT stabilizing step, min A x10      4 hurdles, pt didn't clear the 2nd jennifer until they hurdles were well . DNP    Current HEP:  Abdominal bracing  LAQ  AP  Standing YOUSUF    Billed Treatment Times:  Therapeutic Exercise 30 min    OP EDUCATION:  Outpatient Education  Individual(s) Educated: Patient  Education Provided: Home Exercise Program    Assessment:  PT Assessment  Assessment Comment: Pt performed well this visit compared to last was able to clear hurdles  except for 2 times  Areas of improvements:  Decreased pain, Improved stability, Improved strength, Improved gait pattern, and Improved ability to transfer  Limitations/deficits:  Weakness    Plan:     Continue per POC    Assessment of current progress against goals:  Progressing toward functional goals    Goals:  Active       PT Problem - generalized weakness       PT Goal 1 - STG (Progressing)       Start:  09/25/24    Expected End:  01/24/25       1.  Improve LE AROM to WFL at deficits  2.  Improve LE strength to 4/5 grade at deficits  3.  Improve bilateral hamstring length to 90%  WFL  4.  Improve trunk flexibility to 90% of WFL  5.  Improve trunk strength to Good- or better  6.  Functional Outcome Measure:  50 (62%)               PT Goal 2 - LTG (Progressing)       Start:  09/25/24    Expected End:  03/10/25       LT FUNCTIONAL GOALS  1. Pt reports no falls indoors or outdoors - " met  2. Pt reports able to transfer from a chair in the community without assistance 80% of the time - partially met, takes effort  3. Pt demonstrates improved gait pattern to safely negotiate stairs at home, in the community and on level surfaces - partially met, one at a time  4. Pt reports less difficulty 50% of the time with car transfers - partially met, improving             Patient Stated Goal 1 (Progressing)       Start:  09/25/24    Expected End:  03/10/25       30% improvement    Able to get up from chairs in community (she has been needing assistance at times because not all the chairs have arm rests)    Walk better

## 2024-12-24 ENCOUNTER — TREATMENT (OUTPATIENT)
Dept: PHYSICAL THERAPY | Facility: CLINIC | Age: 89
End: 2024-12-24
Payer: MEDICARE

## 2024-12-24 DIAGNOSIS — M25.562 PAIN IN LEFT KNEE: ICD-10-CM

## 2024-12-24 DIAGNOSIS — G89.29 OTHER CHRONIC PAIN: ICD-10-CM

## 2024-12-24 DIAGNOSIS — Z99.89 DEPENDENCE ON OTHER ENABLING MACHINES AND DEVICES: ICD-10-CM

## 2024-12-24 DIAGNOSIS — M54.50 LOW BACK PAIN, UNSPECIFIED: ICD-10-CM

## 2024-12-24 DIAGNOSIS — R26.89 OTHER ABNORMALITIES OF GAIT AND MOBILITY: ICD-10-CM

## 2024-12-24 DIAGNOSIS — M25.561 PAIN IN RIGHT KNEE: ICD-10-CM

## 2024-12-24 PROCEDURE — 97110 THERAPEUTIC EXERCISES: CPT | Mod: GP | Performed by: PHYSICAL THERAPIST

## 2024-12-24 ASSESSMENT — PAIN - FUNCTIONAL ASSESSMENT: PAIN_FUNCTIONAL_ASSESSMENT: 0-10

## 2024-12-24 ASSESSMENT — PAIN SCALES - GENERAL: PAINLEVEL_OUTOF10: 8

## 2024-12-24 NOTE — PROGRESS NOTES
Physical Therapy Treatment    Patient Name: Michela Mckee  MRN: [id number]  Encounter date:  12/27/2024  Time Calculation  Start Time: 1034  Stop Time: 1115  Time Calculation (min): 41 min     PT Therapeutic Procedures Time Entry  Therapeutic Exercise Time Entry: 38    Visit Number:  12 (including evaluation)  Planned total visits: 12  Visit Authorized/insurance coverage:  NO AUTH, 30.00 CO PAY, 100% COVERAGE, 3000 OOP-NOT MET, MMO   Progress Report due visit #20    Current Problem  Problem List Items Addressed This Visit    None  Visit Diagnoses         Codes    Low back pain, unspecified     M54.50    Other chronic pain     G89.29    Pain in right knee     M25.561    Pain in left knee     M25.562    Other abnormalities of gait and mobility     R26.89    Dependence on other enabling machines and devices     Z99.89           Precautions  Precautions  Precautions Comment: Osteoporosis, h/o falls      Pain  Pain Assessment: 0-10  0-10 (Numeric) Pain Score: 5 - Moderate pain  Response to Interventions: Decrease in pain (4)    Subjective  General  General Comment: Just her knees hurt today    PT  Visit  Response to Previous Treatment: Patient with no complaints from previous session.    Objective  Reaching for furniture or other objects today ambulating into and around the clinic.    Treatment:  Therapeutic Exercise  Therapeutic Exercise Performed: Yes  Continue with DLS and core exercises, postural re-education, LE strengthening, transfer and gait training. Pt has had recent falls and lives alone. Goal is to keep patient living at home safely and pt wants to continue to be active in the community.      Exercises were paced and alteranted standing and sitting so to avoid fatigue.       Pt wore flats today.       Nustep:  UE/LE 4 min, L2     Sit to stand:  5x     Seated HS stretch 3x30 foot on Moflex  LAQ 2x12     handrail  Standing Mini march 4 laps  Side stepping 4 x 8 ft.     Aeromat beam: CGA and gait  "belt  Forward walking 8 lengths  Side step 8 lengths     3 hurdles, 4 laps, knocked over one jennifer times.  Corrected her movement pattern to lead with the foot that caught on the hurdles and pt performed fine.     DNP due to time     Hip Abduction x10  Hip Extension toe taps x10  Hip SLR x10  Heel  taps to 4\" step 2 x10 R/L  DNP  Forward, retro walk 4 CGA        Current HEP:  Abdominal bracing  LAQ  AP  Standing YOUSUF    Billed Treatment Times:  Therapeutic Exercise 38 min    Assessment:  PT Assessment  Assessment Comment: pt moving more slowly today and needed more assistance durnig standing YOUSUF  Areas of improvements:  Improved stability and Improved strength  Limitations/deficits:  Weakness, Unstable, and right knee pain impacting her function    Plan:     Continue with current POC/no changes    Assessment of current progress against goals:  Progressing toward functional goals    Goals:  Active       PT Problem - generalized weakness       PT Goal 1 - STG (Progressing)       Start:  09/25/24    Expected End:  01/24/25       1.  Improve LE AROM to WFL at deficits  2.  Improve LE strength to 4/5 grade at deficits  3.  Improve bilateral hamstring length to 90%  WFL  4.  Improve trunk flexibility to 90% of WFL  5.  Improve trunk strength to Good- or better  6.  Functional Outcome Measure:  50 (62%)               PT Goal 2 - LTG (Progressing)       Start:  09/25/24    Expected End:  03/10/25       LT FUNCTIONAL GOALS  1. Pt reports no falls indoors or outdoors - met  2. Pt reports able to transfer from a chair in the community without assistance 80% of the time - partially met, takes effort  3. Pt demonstrates improved gait pattern to safely negotiate stairs at home, in the community and on level surfaces - partially met, one at a time  4. Pt reports less difficulty 50% of the time with car transfers - partially met, improving             Patient Stated Goal 1 (Progressing)       Start:  09/25/24    Expected End:  " 03/10/25       30% improvement    Able to get up from chairs in community (she has been needing assistance at times because not all the chairs have arm rests)    Walk better

## 2024-12-27 ENCOUNTER — TREATMENT (OUTPATIENT)
Dept: PHYSICAL THERAPY | Facility: CLINIC | Age: 89
End: 2024-12-27
Payer: MEDICARE

## 2024-12-27 DIAGNOSIS — R26.89 OTHER ABNORMALITIES OF GAIT AND MOBILITY: ICD-10-CM

## 2024-12-27 DIAGNOSIS — M25.561 PAIN IN RIGHT KNEE: ICD-10-CM

## 2024-12-27 DIAGNOSIS — G89.29 OTHER CHRONIC PAIN: ICD-10-CM

## 2024-12-27 DIAGNOSIS — M54.50 LOW BACK PAIN, UNSPECIFIED: ICD-10-CM

## 2024-12-27 DIAGNOSIS — Z99.89 DEPENDENCE ON OTHER ENABLING MACHINES AND DEVICES: ICD-10-CM

## 2024-12-27 DIAGNOSIS — M25.562 PAIN IN LEFT KNEE: ICD-10-CM

## 2024-12-27 PROCEDURE — 97110 THERAPEUTIC EXERCISES: CPT | Mod: GP | Performed by: PHYSICAL THERAPIST

## 2024-12-27 ASSESSMENT — PAIN - FUNCTIONAL ASSESSMENT: PAIN_FUNCTIONAL_ASSESSMENT: 0-10

## 2024-12-27 ASSESSMENT — PAIN SCALES - GENERAL: PAINLEVEL_OUTOF10: 5 - MODERATE PAIN

## 2024-12-27 NOTE — PROGRESS NOTES
Physical Therapy Treatment    Patient Name: Michela Mckee  MRN: [id number]  Encounter date:  12/30/2024  Time Calculation  Start Time: 1548  Stop Time: 1631  Time Calculation (min): 43 min     PT Therapeutic Procedures Time Entry  Neuromuscular Re-Education Time Entry: 10  Therapeutic Exercise Time Entry: 30    Visit Number:  13 (including evaluation)  Planned total visits: 20  Visit Authorized/insurance coverage:  NO AUTH, 30.00 CO PAY, 100% COVERAGE, 3000 OOP-NOT MET, MMO   Progress Report due visit #20    Current Problem  Problem List Items Addressed This Visit    None  Visit Diagnoses         Codes    Low back pain, unspecified     M54.50    Other chronic pain     G89.29    Pain in right knee     M25.561    Pain in left knee     M25.562    Other abnormalities of gait and mobility     R26.89    Dependence on other enabling machines and devices     Z99.89           Precautions  Precautions  Precautions Comment: Osteoporosis, h/o falls      Pain  Pain Assessment: 0-10  0-10 (Numeric) Pain Score: 4  Response to Interventions: No change in pain    Subjective  General  General Comment: reduced stamina today.  Knees not too bad (Did some exercises today and yesterday)    PT  Visit  Response to Previous Treatment: Patient with no complaints from previous session.    Objective  Decreased nir this date.  Stop and go. Pt concerned because she is unable to find something she needs at home.  She is distracted today and moving very slowly.  Step - to gait using SPC hurry cane    Treatment:  Therapeutic Exercise  Therapeutic Exercise Performed: Yes  Continue with DLS and core exercises, postural re-education, LE strengthening, transfer and gait training. Pt has had recent falls and lives alone. Goal is to keep patient living at home safely and pt wants to continue to be active in the community.      Exercises were paced and alteranted standing and sitting so to avoid fatigue.       Pt wore sneakers today.      "  Nustep:  UE/LE 4 min, L2     Sit to stand:  5x, 53 seconds     Seated HS stretch 3x30 foot on Moflex  LAQ alternate R/L 1#, alternate with marching 2x12     handrail  Standing Mini march 4 laps  Side stepping 4 x 8 ft.     Aeromat beam: CGA and gait belt  Forward walking 8 lengths  Side step 8 lengths     3 hurdles, 4 laps, knocked over one jennifer times.  Corrected her movement pattern to lead with the foot that caught on the hurdles and pt performed fine.     DNP due to time     Hip Abduction x10  Hip Extension toe taps x10  Hip SLR x10  Heel  taps to 4\" step 2 x10 R/L  DNP  Forward, retro walk 4 CGA        Current HEP:  Abdominal bracing  LAQ  AP  Standing YOUSUF    Billed Treatment Times:  Therapeutic Exercise 30 min      Balance/Neuromuscular Re-Education  Balance/Neuromuscular Re-Education Activity Performed: Yes  Balance/NMRE:     //bars  5 spike step over, forward R/L, then S-S  Add lateral, forward forward, lateral  Billed Treatment Times:  Neuromuscular Re-education 10 min    Assessment:  PT Assessment  Assessment Comment: Improved ability to negotiate low profile obsticals today.  Did not require gait belt  Areas of improvements:  Improved stability and Improved strength  Limitations/deficits:  Weakness and Unstable    Plan:     Continue with current POC/no changes    Assessment of current progress against goals:  Progressing toward functional goals    Goals:  Active       PT Problem - generalized weakness       PT Goal 1 - STG (Progressing)       Start:  09/25/24    Expected End:  01/24/25       1.  Improve LE AROM to WFL at deficits  2.  Improve LE strength to 4/5 grade at deficits  3.  Improve bilateral hamstring length to 90%  WFL  4.  Improve trunk flexibility to 90% of WFL  5.  Improve trunk strength to Good- or better  6.  Functional Outcome Measure:  50 (62%)               PT Goal 2 - LTG (Progressing)       Start:  09/25/24    Expected End:  03/10/25       LT FUNCTIONAL GOALS  1. Pt reports no " falls indoors or outdoors - met  2. Pt reports able to transfer from a chair in the community without assistance 80% of the time - partially met, takes effort  3. Pt demonstrates improved gait pattern to safely negotiate stairs at home, in the community and on level surfaces - partially met, one at a time  4. Pt reports less difficulty 50% of the time with car transfers - partially met, improving             Patient Stated Goal 1 (Progressing)       Start:  09/25/24    Expected End:  03/10/25       30% improvement    Able to get up from chairs in community (she has been needing assistance at times because not all the chairs have arm rests)    Walk better

## 2024-12-30 ENCOUNTER — TREATMENT (OUTPATIENT)
Dept: PHYSICAL THERAPY | Facility: CLINIC | Age: 89
End: 2024-12-30
Payer: MEDICARE

## 2024-12-30 DIAGNOSIS — M25.562 PAIN IN LEFT KNEE: ICD-10-CM

## 2024-12-30 DIAGNOSIS — G89.29 OTHER CHRONIC PAIN: ICD-10-CM

## 2024-12-30 DIAGNOSIS — Z99.89 DEPENDENCE ON OTHER ENABLING MACHINES AND DEVICES: ICD-10-CM

## 2024-12-30 DIAGNOSIS — M25.561 PAIN IN RIGHT KNEE: ICD-10-CM

## 2024-12-30 DIAGNOSIS — R26.89 OTHER ABNORMALITIES OF GAIT AND MOBILITY: ICD-10-CM

## 2024-12-30 DIAGNOSIS — M54.50 LOW BACK PAIN, UNSPECIFIED: ICD-10-CM

## 2024-12-30 PROCEDURE — 97112 NEUROMUSCULAR REEDUCATION: CPT | Mod: GP,KX | Performed by: PHYSICAL THERAPIST

## 2024-12-30 PROCEDURE — 97110 THERAPEUTIC EXERCISES: CPT | Mod: GP,KX | Performed by: PHYSICAL THERAPIST

## 2024-12-30 ASSESSMENT — PAIN - FUNCTIONAL ASSESSMENT: PAIN_FUNCTIONAL_ASSESSMENT: 0-10

## 2024-12-30 ASSESSMENT — PAIN SCALES - GENERAL: PAINLEVEL_OUTOF10: 4

## 2025-01-08 ENCOUNTER — APPOINTMENT (OUTPATIENT)
Dept: PHYSICAL THERAPY | Facility: CLINIC | Age: OVER 89
End: 2025-01-08
Payer: MEDICARE

## 2025-01-13 ENCOUNTER — APPOINTMENT (OUTPATIENT)
Dept: PHYSICAL THERAPY | Facility: CLINIC | Age: OVER 89
End: 2025-01-13
Payer: MEDICARE

## 2025-01-13 NOTE — PROGRESS NOTES
"    Physical Therapy Treatment    Patient Name: Michela Mckee  MRN: [id number]  Encounter date:  1/13/2025             Visit Number:  14 (including evaluation)  Planned total visits: 20  Visit Authorized/insurance coverage:  NO AUTH, 30.00 CO PAY, 100% COVERAGE, 3000 OOP-NOT MET, MMO   Progress Report due visit #20        Current Problem  Problem List Items Addressed This Visit    None    Precautions         Pain       Subjective  General            Objective  ***    Treatment:     Continue with DLS and core exercises, postural re-education, LE strengthening, transfer and gait training. Pt has had recent falls and lives alone. Goal is to keep patient living at home safely and pt wants to continue to be active in the community.      Exercises were paced and alteranted standing and sitting so to avoid fatigue.       Pt wore sneakers today.       Nustep:  UE/LE 4 min, L2     Sit to stand:  5x, 53 seconds     Seated HS stretch 3x30 foot on Moflex  LAQ alternate R/L 1#, alternate with marching 2x12     handrail  Standing Mini march 4 laps  Side stepping 4 x 8 ft.     Aeromat beam: CGA and gait belt  Forward walking 8 lengths  Side step 8 lengths     3 hurdles, 4 laps, knocked over one jennifer times.  Corrected her movement pattern to lead with the foot that caught on the hurdles and pt performed fine.     DNP due to time     Hip Abduction x10  Hip Extension toe taps x10  Hip SLR x10  Heel  taps to 4\" step 2 x10 R/L  DNP  Forward, retro walk 4 CGA    Current HEP:  ***    Has patient been compliant with HEP?  {YES/NO:68549}    Billed Treatment Times:  Therapeutic Exercise *** min            Balance/NMRE:  //bars  5 spike step over, forward R/L, then S-S  Add lateral, forward forward, lateral  Billed Treatment Times:  Neuromuscular Re-education *** min        OP EDUCATION:       Assessment:     Areas of improvements:  Improved stability and Improved strength  Limitations/deficits:  Weakness and Unstable      Pain end of " session:  ***    Plan:     Continue with current POC/no changes    Assessment of current progress against goals:  Progressing toward functional goals    Goals:  Active       PT Problem - generalized weakness       PT Goal 1 - STG (Progressing)       Start:  09/25/24    Expected End:  01/24/25       1.  Improve LE AROM to WFL at deficits  2.  Improve LE strength to 4/5 grade at deficits  3.  Improve bilateral hamstring length to 90%  WFL  4.  Improve trunk flexibility to 90% of WFL  5.  Improve trunk strength to Good- or better  6.  Functional Outcome Measure:  50 (62%)               PT Goal 2 - LTG (Progressing)       Start:  09/25/24    Expected End:  03/10/25       LT FUNCTIONAL GOALS  1. Pt reports no falls indoors or outdoors - met  2. Pt reports able to transfer from a chair in the community without assistance 80% of the time - partially met, takes effort  3. Pt demonstrates improved gait pattern to safely negotiate stairs at home, in the community and on level surfaces - partially met, one at a time  4. Pt reports less difficulty 50% of the time with car transfers - partially met, improving             Patient Stated Goal 1 (Progressing)       Start:  09/25/24    Expected End:  03/10/25       30% improvement    Able to get up from chairs in community (she has been needing assistance at times because not all the chairs have arm rests)    Walk better

## 2025-01-15 NOTE — PROGRESS NOTES
"    Physical Therapy Treatment    Patient Name: Michela Mckee  MRN: [id number]  Encounter date:  1/17/2025             Visit Number:  14 (including evaluation)  Planned total visits: 20  Visit Authorized/insurance coverage:  NO AUTH, 30.00 CO PAY, 100% COVERAGE, 3000 OOP-NOT MET, MMO   Progress Report due visit #20    Current Problem  Problem List Items Addressed This Visit    None     Precautions         Pain       Subjective  General            Objective  ***    Treatment:  {PT Treatments:29724}  Continue with DLS and core exercises, postural re-education, LE strengthening, transfer and gait training. Pt has had recent falls and lives alone. Goal is to keep patient living at home safely and pt wants to continue to be active in the community.      Exercises were paced and alteranted standing and sitting so to avoid fatigue.       Pt wore sneakers today.       Nustep:  UE/LE 4 min, L2     Sit to stand:  5x, 53 seconds     Seated HS stretch 3x30 foot on Moflex  LAQ alternate R/L 1#, alternate with marching 2x12     handrail  Standing Mini march 4 laps  Side stepping 4 x 8 ft.     Aeromat beam: CGA and gait belt  Forward walking 8 lengths  Side step 8 lengths     3 hurdles, 4 laps, knocked over one jennifer times.  Corrected her movement pattern to lead with the foot that caught on the hurdles and pt performed fine.     DNP due to time     Hip Abduction x10  Hip Extension toe taps x10  Hip SLR x10  Heel  taps to 4\" step 2 x10 R/L  DNP  Forward, retro walk 4 CGA        Current HEP:  Abdominal bracing  LAQ  AP  Standing YOUSUF    Current HEP:  ***    Has patient been compliant with HEP?  {YES/NO:16880}    Billed Treatment Times:  {Treatment times:57435}    {PT Treatments:71219}  Manual:    ***  Billed Treatment Times:  {Treatment times:52082}      {PT Treatments:85258}  Balance/NMRE:     ***  Billed Treatment Times:  {Treatment times:90979}    {PT Treatments:50112}  Therapeutic Activity:  ***  Billed Treatment " Times:  {Treatment times:95990}    OP EDUCATION:       Assessment:     Areas of improvements:  {basassessmentimprovements:29470}  Limitations/deficits:  {basassessmentlimitations/deficits:93482}    Pain end of session:  ***    Plan:     {BASPLAN:40170}    Assessment of current progress against goals:  {BASPTNOTEGOALASSESSMENT:45467}    Goals:  Active       PT Problem - generalized weakness       PT Goal 1 - STG (Progressing)       Start:  09/25/24    Expected End:  01/24/25       1.  Improve LE AROM to WFL at deficits  2.  Improve LE strength to 4/5 grade at deficits  3.  Improve bilateral hamstring length to 90%  WFL  4.  Improve trunk flexibility to 90% of WFL  5.  Improve trunk strength to Good- or better  6.  Functional Outcome Measure:  50 (62%)               PT Goal 2 - LTG (Progressing)       Start:  09/25/24    Expected End:  03/10/25       LT FUNCTIONAL GOALS  1. Pt reports no falls indoors or outdoors - met  2. Pt reports able to transfer from a chair in the community without assistance 80% of the time - partially met, takes effort  3. Pt demonstrates improved gait pattern to safely negotiate stairs at home, in the community and on level surfaces - partially met, one at a time  4. Pt reports less difficulty 50% of the time with car transfers - partially met, improving             Patient Stated Goal 1 (Progressing)       Start:  09/25/24    Expected End:  03/10/25       30% improvement    Able to get up from chairs in community (she has been needing assistance at times because not all the chairs have arm rests)    Walk better

## 2025-01-17 ENCOUNTER — APPOINTMENT (OUTPATIENT)
Dept: PHYSICAL THERAPY | Facility: CLINIC | Age: OVER 89
End: 2025-01-17
Payer: MEDICARE

## 2025-01-21 ENCOUNTER — APPOINTMENT (OUTPATIENT)
Dept: PHYSICAL THERAPY | Facility: CLINIC | Age: OVER 89
End: 2025-01-21
Payer: MEDICARE

## 2025-01-23 ENCOUNTER — APPOINTMENT (OUTPATIENT)
Dept: PHYSICAL THERAPY | Facility: CLINIC | Age: OVER 89
End: 2025-01-23
Payer: MEDICARE

## 2025-01-27 ENCOUNTER — TREATMENT (OUTPATIENT)
Dept: PHYSICAL THERAPY | Facility: CLINIC | Age: OVER 89
End: 2025-01-27
Payer: MEDICARE

## 2025-01-27 DIAGNOSIS — M25.561 PAIN IN RIGHT KNEE: ICD-10-CM

## 2025-01-27 DIAGNOSIS — G89.29 OTHER CHRONIC PAIN: ICD-10-CM

## 2025-01-27 DIAGNOSIS — M54.50 LOW BACK PAIN, UNSPECIFIED: ICD-10-CM

## 2025-01-27 DIAGNOSIS — M25.562 PAIN IN LEFT KNEE: ICD-10-CM

## 2025-01-27 DIAGNOSIS — Z99.89 DEPENDENCE ON OTHER ENABLING MACHINES AND DEVICES: ICD-10-CM

## 2025-01-27 DIAGNOSIS — R26.89 OTHER ABNORMALITIES OF GAIT AND MOBILITY: ICD-10-CM

## 2025-01-27 PROCEDURE — 97110 THERAPEUTIC EXERCISES: CPT | Mod: GP,CQ

## 2025-01-27 PROCEDURE — 97112 NEUROMUSCULAR REEDUCATION: CPT | Mod: GP,CQ

## 2025-01-27 ASSESSMENT — PAIN - FUNCTIONAL ASSESSMENT: PAIN_FUNCTIONAL_ASSESSMENT: 0-10

## 2025-01-27 ASSESSMENT — PAIN SCALES - GENERAL: PAINLEVEL_OUTOF10: 4

## 2025-01-27 NOTE — PROGRESS NOTES
Physical Therapy Treatment    Patient Name: Michela Mckee  MRN: [id number]  Encounter date:  1/27/2025  Time Calculation  Start Time: 1100  Stop Time: 1149  Time Calculation (min): 49 min     PT Therapeutic Procedures Time Entry  Neuromuscular Re-Education Time Entry: 12  Therapeutic Exercise Time Entry: 30    Visit Number:  14 (including evaluation)  Planned total visits: 20  Visit Authorized/insurance coverage:  NO AUTH, 30.00 CO PAY, 100% COVERAGE, 3000 OOP-NOT MET, MMO   Progress Report due visit #20        Current Problem  Problem List Items Addressed This Visit    None  Visit Diagnoses         Codes    Low back pain, unspecified     M54.50    Other chronic pain     G89.29    Pain in right knee     M25.561    Pain in left knee     M25.562    Other abnormalities of gait and mobility     R26.89    Dependence on other enabling machines and devices     Z99.89           Precautions  Precautions  Precautions Comment: Osteoporosis, h/o falls      Pain  Pain Assessment: 0-10  0-10 (Numeric) Pain Score: 4    Subjective  General   Patient reports pain is in B hip, sometimes it is one or the other.        Objective  Patient has not been to therapy for 2 weeks secondary to weather.    Treatment:  Therapeutic Exercise  Therapeutic Exercise Performed: Yes  Continue with DLS and core exercises, postural re-education, LE strengthening, transfer and gait training. Pt has had recent falls and lives alone. Goal is to keep patient living at home safely and pt wants to continue to be active in the community.      Exercises were paced and alteranted standing and sitting so to avoid fatigue.       Pt did not wear sneakers today.       Nustep:  UE/LE 4 min, L2     Sit to stand:  5x     Seated HS stretch 3x30 foot on Moflex  LAQ alternate R/L 1#, alternate with marching 2x12     handrail  Standing Mini march 4 laps  Side stepping 4 x 8 ft.  Retro walking with B UE support 4 laps     Aeromat beam: CGA and gait belt  Forward  "walking 8 lengths  Side step 8 lengths     3 hurdles, 4 laps, knocked over 1 jennifer on the the first lap, but was able to clear on hurdles afterward with both feet.    Heel  taps to 4\" step 2 x10 R/L  DNP     DNP due to time  Hip Abduction x10  Hip Extension toe taps x10  Hip SLR x10    Forward, retro walk 4 CGA    Current HEP:  Abdominal bracing  LAQ  AP  Standing YOUSUF    Has patient been compliant with HEP?  Yes    Billed Treatment Times:  Therapeutic Exercise 29 min      Balance/Neuromuscular Re-Education  Balance/Neuromuscular Re-Education Activity Performed: Yes  Balance/NMRE:     //bars  5 spike step over, forward R/L, then S-S  Add lateral, forward forward, lateral  Billed Treatment Times:  Neuromuscular Re-education 10 min        OP EDUCATION:       Assessment:   Improved stability with all exercises today.  She required minimal CGA with gait belt today.  She was able to perform retro walking in // bars but she has a very NBOS which displays some instability. She displayed good stamina today and reports she has been doing her HEP regularly since she has not been able to come due to weather.  Areas of improvements:  Improved stability and Improved strength  Limitations/deficits:  Weakness and Unstable    Pain end of session:  3/10    Plan:     Continue with current POC/no changes    Assessment of current progress against goals:  Progressing toward functional goals    Goals:  Active       PT Problem - generalized weakness       PT Goal 1 - STG (Progressing)       Start:  09/25/24    Expected End:  01/24/25       1.  Improve LE AROM to WFL at deficits  2.  Improve LE strength to 4/5 grade at deficits  3.  Improve bilateral hamstring length to 90%  WFL  4.  Improve trunk flexibility to 90% of WFL  5.  Improve trunk strength to Good- or better  6.  Functional Outcome Measure:  50 (62%)               PT Goal 2 - LTG (Progressing)       Start:  09/25/24    Expected End:  03/10/25       LT FUNCTIONAL GOALS  1. Pt " reports no falls indoors or outdoors - met  2. Pt reports able to transfer from a chair in the community without assistance 80% of the time - partially met, takes effort  3. Pt demonstrates improved gait pattern to safely negotiate stairs at home, in the community and on level surfaces - partially met, one at a time  4. Pt reports less difficulty 50% of the time with car transfers - partially met, improving             Patient Stated Goal 1 (Progressing)       Start:  09/25/24    Expected End:  03/10/25       30% improvement    Able to get up from chairs in community (she has been needing assistance at times because not all the chairs have arm rests)    Walk better

## 2025-01-28 NOTE — PROGRESS NOTES
Physical Therapy Treatment    Patient Name: Michela Mckee  MRN: [id number]  Encounter date:  1/30/2025  Time Calculation  Start Time: 1050  Stop Time: 1145  Time Calculation (min): 55 min     PT Therapeutic Procedures Time Entry  Therapeutic Exercise Time Entry: 45    Visit Number:  15 (including evaluation)  Planned total visits: 20  Visit Authorized/insurance coverage:  NO AUTH, 30.00 CO PAY, 100% COVERAGE, 3000 OOP-NOT MET, MMO   Progress Report due visit #20    Current Problem  Problem List Items Addressed This Visit    None  Visit Diagnoses         Codes    Low back pain, unspecified     M54.50    Other chronic pain     G89.29    Pain in right knee     M25.561    Pain in left knee     M25.562    Other abnormalities of gait and mobility     R26.89    Dependence on other enabling machines and devices     Z99.89           Precautions  Precautions  Precautions Comment: Osteoporosis, h/o falls      Pain  Pain Assessment: 0-10  0-10 (Numeric) Pain Score: 2  Response to Interventions: No change in pain    Subjective  General  General Comment: ore her tennis shoes, had a rough morning with circumstances at home    PT  Visit  Response to Previous Treatment: Patient with no complaints from previous session.    Objective  Balance, 1 UE A - fair and needs CGA for safety during balance activities    Treatment:  Therapeutic Exercise  Therapeutic Exercise Performed: Yes  Continue with DLS and core exercises, postural re-education, LE strengthening, transfer and gait training. Pt has had recent falls and lives alone. Goal is to keep patient living at home safely and pt wants to continue to be active in the community.      Continue to pace and alteranted standing and sitting activities so to avoid fatigue.      Next visit - address BALANCE/NMRE with dynamic exercises     Pt had her sneakers today.       Nustep:  UE/LE 4 min, L2     Sit to stand:  5x     Seated HS stretch 3x30 foot on Moflex  LAQ alternate R/L 1#,  "alternate with marching 2x12     Handrail Min/cga  Standing Mini march 4 laps  Side stepping 4 x 8 ft.  Retro walking with B UE support 4 laps     //bars CGA, bilateral UE A  4 hurdles, 4 lengths   Heel  taps to 4\" step 2 x10 R/L  DNP       DNP   Hip Abduction x10  Hip Extension toe taps x10  Hip SLR x10     Forward, retro walk 4 CGA  Aeromat beam: CGA and gait belt  Forward walking 8 lengths  Side step 8 lengths    Current HEP:  Abdominal bracing  LAQ  AP  Standing YOUSUF    Has patient been compliant with HEP?  Yes    Billed Treatment Times:  Therapeutic Exercise 45 min    OP EDUCATION:       Assessment:  PT Assessment  Assessment Comment: Pt was more steadyt and confident during standing activiites.  She did not knock over any hurdles. Good foot clearance and pace negotiating the hurdles  Areas of improvements:  Improved stability, Improved strength, and Improved gait pattern  Limitations/deficits:  Weakness and Balance which improves with proper footwear    Plan:     Continue with current POC/no changes    Assessment of current progress against goals:  Progressing toward functional goals    Goals:  Active       PT Problem - generalized weakness       PT Goal 1 - STG (Progressing)       Start:  09/25/24    Expected End:  01/24/25       1.  Improve LE AROM to WFL at deficits  2.  Improve LE strength to 4/5 grade at deficits  3.  Improve bilateral hamstring length to 90%  WFL  4.  Improve trunk flexibility to 90% of WFL  5.  Improve trunk strength to Good- or better  6.  Functional Outcome Measure:  50 (62%)               PT Goal 2 - LTG (Progressing)       Start:  09/25/24    Expected End:  03/10/25       LT FUNCTIONAL GOALS  1. Pt reports no falls indoors or outdoors - met  2. Pt reports able to transfer from a chair in the community without assistance 80% of the time - partially met, takes effort  3. Pt demonstrates improved gait pattern to safely negotiate stairs at home, in the community and on level surfaces - " partially met, one at a time  4. Pt reports less difficulty 50% of the time with car transfers - partially met, improving             Patient Stated Goal 1 (Progressing)       Start:  09/25/24    Expected End:  03/10/25       30% improvement    Able to get up from chairs in community (she has been needing assistance at times because not all the chairs have arm rests)    Walk better

## 2025-01-30 ENCOUNTER — TREATMENT (OUTPATIENT)
Dept: PHYSICAL THERAPY | Facility: CLINIC | Age: OVER 89
End: 2025-01-30
Payer: MEDICARE

## 2025-01-30 DIAGNOSIS — M54.50 LOW BACK PAIN, UNSPECIFIED: ICD-10-CM

## 2025-01-30 DIAGNOSIS — M25.561 PAIN IN RIGHT KNEE: ICD-10-CM

## 2025-01-30 DIAGNOSIS — G89.29 OTHER CHRONIC PAIN: ICD-10-CM

## 2025-01-30 DIAGNOSIS — M25.562 PAIN IN LEFT KNEE: ICD-10-CM

## 2025-01-30 DIAGNOSIS — Z99.89 DEPENDENCE ON OTHER ENABLING MACHINES AND DEVICES: ICD-10-CM

## 2025-01-30 DIAGNOSIS — R26.89 OTHER ABNORMALITIES OF GAIT AND MOBILITY: ICD-10-CM

## 2025-01-30 PROCEDURE — 97110 THERAPEUTIC EXERCISES: CPT | Mod: GP | Performed by: PHYSICAL THERAPIST

## 2025-01-30 ASSESSMENT — PAIN SCALES - GENERAL: PAINLEVEL_OUTOF10: 2

## 2025-01-30 ASSESSMENT — PAIN - FUNCTIONAL ASSESSMENT: PAIN_FUNCTIONAL_ASSESSMENT: 0-10

## 2025-03-05 ENCOUNTER — HOSPITAL ENCOUNTER (OUTPATIENT)
Dept: RADIOLOGY | Facility: CLINIC | Age: OVER 89
Discharge: HOME | End: 2025-03-05
Payer: MEDICARE

## 2025-03-05 DIAGNOSIS — M54.50 CHRONIC BILATERAL LOW BACK PAIN WITHOUT SCIATICA: ICD-10-CM

## 2025-03-05 DIAGNOSIS — G89.29 CHRONIC BILATERAL LOW BACK PAIN WITHOUT SCIATICA: ICD-10-CM

## 2025-03-05 PROCEDURE — 72220 X-RAY EXAM SACRUM TAILBONE: CPT

## 2025-03-05 PROCEDURE — 72110 X-RAY EXAM L-2 SPINE 4/>VWS: CPT

## 2025-03-06 LAB
25(OH)D3+25(OH)D2 SERPL-MCNC: 60 NG/ML (ref 30–100)
ALBUMIN SERPL-MCNC: 4.1 G/DL (ref 3.6–5.1)
ALP SERPL-CCNC: 102 U/L (ref 37–153)
ALT SERPL-CCNC: 15 U/L (ref 6–29)
ANION GAP SERPL CALCULATED.4IONS-SCNC: 10 MMOL/L (CALC) (ref 7–17)
AST SERPL-CCNC: 14 U/L (ref 10–35)
BASOPHILS # BLD AUTO: 50 CELLS/UL (ref 0–200)
BASOPHILS NFR BLD AUTO: 0.5 %
BILIRUB SERPL-MCNC: 0.6 MG/DL (ref 0.2–1.2)
BUN SERPL-MCNC: 27 MG/DL (ref 7–25)
CALCIUM SERPL-MCNC: 9.4 MG/DL (ref 8.6–10.4)
CHLORIDE SERPL-SCNC: 109 MMOL/L (ref 98–110)
CHOLEST SERPL-MCNC: 201 MG/DL
CHOLEST/HDLC SERPL: 3.9 (CALC)
CO2 SERPL-SCNC: 25 MMOL/L (ref 20–32)
CREAT SERPL-MCNC: 0.64 MG/DL (ref 0.6–0.95)
EGFRCR SERPLBLD CKD-EPI 2021: 84 ML/MIN/1.73M2
EOSINOPHIL # BLD AUTO: 287 CELLS/UL (ref 15–500)
EOSINOPHIL NFR BLD AUTO: 2.9 %
ERYTHROCYTE [DISTWIDTH] IN BLOOD BY AUTOMATED COUNT: 12.7 % (ref 11–15)
GLUCOSE SERPL-MCNC: 126 MG/DL (ref 65–139)
HCT VFR BLD AUTO: 41.2 % (ref 35–45)
HDLC SERPL-MCNC: 51 MG/DL
HGB BLD-MCNC: 13.4 G/DL (ref 11.7–15.5)
LDLC SERPL CALC-MCNC: 127 MG/DL (CALC)
LYMPHOCYTES # BLD AUTO: 1752 CELLS/UL (ref 850–3900)
LYMPHOCYTES NFR BLD AUTO: 17.7 %
MCH RBC QN AUTO: 28.9 PG (ref 27–33)
MCHC RBC AUTO-ENTMCNC: 32.5 G/DL (ref 32–36)
MCV RBC AUTO: 88.8 FL (ref 80–100)
MONOCYTES # BLD AUTO: 960 CELLS/UL (ref 200–950)
MONOCYTES NFR BLD AUTO: 9.7 %
NEUTROPHILS # BLD AUTO: 6851 CELLS/UL (ref 1500–7800)
NEUTROPHILS NFR BLD AUTO: 69.2 %
NONHDLC SERPL-MCNC: 150 MG/DL (CALC)
PLATELET # BLD AUTO: 230 THOUSAND/UL (ref 140–400)
PMV BLD REES-ECKER: 11.4 FL (ref 7.5–12.5)
POTASSIUM SERPL-SCNC: 4.1 MMOL/L (ref 3.5–5.3)
PROT SERPL-MCNC: 6.6 G/DL (ref 6.1–8.1)
RBC # BLD AUTO: 4.64 MILLION/UL (ref 3.8–5.1)
SODIUM SERPL-SCNC: 144 MMOL/L (ref 135–146)
TRIGL SERPL-MCNC: 121 MG/DL
TSH SERPL-ACNC: 1.28 MIU/L (ref 0.4–4.5)
VIT B12 SERPL-MCNC: 587 PG/ML (ref 200–1100)
WBC # BLD AUTO: 9.9 THOUSAND/UL (ref 3.8–10.8)

## 2025-03-08 ENCOUNTER — TELEPHONE (OUTPATIENT)
Dept: PRIMARY CARE | Facility: CLINIC | Age: OVER 89
End: 2025-03-08
Payer: MEDICARE

## 2025-03-08 DIAGNOSIS — N30.01 ACUTE CYSTITIS WITH HEMATURIA: Primary | ICD-10-CM

## 2025-03-08 LAB
APPEARANCE UR: ABNORMAL
BACTERIA #/AREA URNS HPF: ABNORMAL /HPF
BACTERIA UR CULT: ABNORMAL
BACTERIA UR CULT: ABNORMAL
BILIRUB UR QL STRIP: NEGATIVE
COLOR UR: YELLOW
GLUCOSE UR QL STRIP: NEGATIVE
HGB UR QL STRIP: ABNORMAL
HYALINE CASTS #/AREA URNS LPF: ABNORMAL /LPF
KETONES UR QL STRIP: ABNORMAL
LEUKOCYTE ESTERASE UR QL STRIP: ABNORMAL
NITRITE UR QL STRIP: NEGATIVE
PH UR STRIP: 5.5 [PH] (ref 5–8)
PROT UR QL STRIP: ABNORMAL
RBC #/AREA URNS HPF: ABNORMAL /HPF
SERVICE CMNT-IMP: ABNORMAL
SP GR UR STRIP: 1.02 (ref 1–1.03)
SQUAMOUS #/AREA URNS HPF: ABNORMAL /HPF
WBC #/AREA URNS HPF: ABNORMAL /HPF

## 2025-03-08 RX ORDER — NITROFURANTOIN 25; 75 MG/1; MG/1
100 CAPSULE ORAL 2 TIMES DAILY
Qty: 10 CAPSULE | Refills: 0 | Status: SHIPPED | OUTPATIENT
Start: 2025-03-08 | End: 2025-03-13

## 2025-03-10 NOTE — TELEPHONE ENCOUNTER
Pts daughter had on call provider paged as they got the results of her ua via the [employer] edna and was seeking treatment for her uti.  Meds sent to her pharmacy advised culture would follow if change in meds needed we would contact them

## 2025-03-12 ENCOUNTER — DOCUMENTATION (OUTPATIENT)
Dept: PHYSICAL THERAPY | Facility: CLINIC | Age: OVER 89
End: 2025-03-12
Payer: MEDICARE

## 2025-03-12 NOTE — PROGRESS NOTES
Physical Therapy    Discharge Summary    Name: Michela Mckee  MRN: [id number]  : 1935  Date: 25    Discharge Summary: PT    Discharge Information: Date of discharge 2025    Therapy Summary: Pt last seen on 2025; Balance was steadily improving.  Pt reports more confidence during standing activities. She did not schedule additional PT visits.     Discharge Status: HEP     Rehab Discharge Reason: Other no further visits scheduled

## 2025-03-21 ENCOUNTER — APPOINTMENT (OUTPATIENT)
Dept: PRIMARY CARE | Facility: CLINIC | Age: OVER 89
End: 2025-03-21
Payer: MEDICARE

## 2025-03-25 ENCOUNTER — OFFICE VISIT (OUTPATIENT)
Dept: PRIMARY CARE | Facility: CLINIC | Age: OVER 89
End: 2025-03-25
Payer: MEDICARE

## 2025-03-25 ENCOUNTER — APPOINTMENT (OUTPATIENT)
Dept: PRIMARY CARE | Facility: CLINIC | Age: OVER 89
End: 2025-03-25
Payer: MEDICARE

## 2025-03-25 VITALS — OXYGEN SATURATION: 97 % | HEART RATE: 83 BPM | DIASTOLIC BLOOD PRESSURE: 82 MMHG | SYSTOLIC BLOOD PRESSURE: 126 MMHG

## 2025-03-25 DIAGNOSIS — S81.812A SKIN TEAR OF LEFT LOWER LEG WITHOUT COMPLICATION, INITIAL ENCOUNTER: ICD-10-CM

## 2025-03-25 DIAGNOSIS — N30.01 ACUTE CYSTITIS WITH HEMATURIA: Primary | ICD-10-CM

## 2025-03-25 DIAGNOSIS — R60.0 PERIPHERAL EDEMA: ICD-10-CM

## 2025-03-25 LAB
POC APPEARANCE, URINE: ABNORMAL
POC BILIRUBIN, URINE: NEGATIVE
POC BLOOD, URINE: ABNORMAL
POC COLOR, URINE: YELLOW
POC GLUCOSE, URINE: NEGATIVE MG/DL
POC KETONES, URINE: NEGATIVE MG/DL
POC LEUKOCYTES, URINE: ABNORMAL
POC NITRITE,URINE: NEGATIVE
POC PH, URINE: 6 PH
POC PROTEIN, URINE: ABNORMAL MG/DL
POC SPECIFIC GRAVITY, URINE: 1.02
POC UROBILINOGEN, URINE: 0.2 EU/DL

## 2025-03-25 PROCEDURE — 81003 URINALYSIS AUTO W/O SCOPE: CPT | Performed by: FAMILY MEDICINE

## 2025-03-25 PROCEDURE — 1036F TOBACCO NON-USER: CPT | Performed by: FAMILY MEDICINE

## 2025-03-25 PROCEDURE — G2211 COMPLEX E/M VISIT ADD ON: HCPCS | Performed by: FAMILY MEDICINE

## 2025-03-25 PROCEDURE — 1157F ADVNC CARE PLAN IN RCRD: CPT | Performed by: FAMILY MEDICINE

## 2025-03-25 PROCEDURE — 1159F MED LIST DOCD IN RCRD: CPT | Performed by: FAMILY MEDICINE

## 2025-03-25 PROCEDURE — 1123F ACP DISCUSS/DSCN MKR DOCD: CPT | Performed by: FAMILY MEDICINE

## 2025-03-25 PROCEDURE — 99214 OFFICE O/P EST MOD 30 MIN: CPT | Performed by: FAMILY MEDICINE

## 2025-03-25 RX ORDER — BISMUTH TRIBROMOPH/PETROLATUM 4" X 4"
1 BANDAGE TOPICAL DAILY
Qty: 50 EACH | Refills: 0 | Status: SHIPPED | OUTPATIENT
Start: 2025-03-25 | End: 2025-04-24

## 2025-03-25 RX ORDER — ELASTIC BANDAGE 6" X 5YARD
BANDAGE TOPICAL
Qty: 12 EACH | Refills: 0 | Status: SHIPPED | OUTPATIENT
Start: 2025-03-25

## 2025-03-25 RX ORDER — FUROSEMIDE 20 MG/1
20 TABLET ORAL DAILY PRN
Qty: 20 TABLET | Refills: 0 | Status: SHIPPED | OUTPATIENT
Start: 2025-03-25 | End: 2025-04-14

## 2025-03-25 RX ORDER — CEPHALEXIN 500 MG/1
500 CAPSULE ORAL 2 TIMES DAILY
Qty: 20 CAPSULE | Refills: 0 | Status: SHIPPED | OUTPATIENT
Start: 2025-03-25 | End: 2025-04-04

## 2025-03-25 ASSESSMENT — PATIENT HEALTH QUESTIONNAIRE - PHQ9
1. LITTLE INTEREST OR PLEASURE IN DOING THINGS: NOT AT ALL
2. FEELING DOWN, DEPRESSED OR HOPELESS: NOT AT ALL
SUM OF ALL RESPONSES TO PHQ9 QUESTIONS 1 AND 2: 0
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
## 2025-03-25 NOTE — PATIENT INSTRUCTIONS
It sounds like you had a kidney stone  The urine today still shows some blood and questionable for infection, we will send it for a culture.   Stay well hydrated   If the pain comes back . Let me know we can order a CT scan to check for a stone     Referral to Home health for wound care   Some supplies were ordered, you might need to go a medical supply company like [employer]. See if local pharmacy will fill it first.     Refills of the lasix to take in the AM for 3 days for the leg swelling  Try to keep the leg elevated   Hold off on the leg pump while the left leg wound is open   Continue the compression treatment of the legs   I refilled the lasix to use for a few days     Keep taking the vitamin D supplement at 2000 international units of D3 per day over the counter.    For the right knee pain, you could try more PT or again see Ortho for injection.     Please monitor blood pressure at home. BP is good today at 128/70.   Goal is less than 130/80.     Labs on 3/5/25- Electrolytes, liver, kidney and thyroid look good. Normal blood counts. Cholesterol was a bit elevated with LDL at 127, with good HDL and TG. B12 is in range . Vitamin D is good at 60.  This looked great!!     Continue the lymphedema pumps   For the leg swelling, consider getting a chair to get the legs elevated.     Also try the voltaren (diclofenac) gel to rub into the hands, you can follow up with the hand specialist as needed, glad the hand procedure went well    Continue follow up with dermatologist for cancer checks. Scheduled in August 2025.     DEXA was on 10/31/23-Bone density showed persistence of the osteopenia, this is good. We can repeat in 2-3 years if she would like.     Follow up in 6 months for a Medicare Wellness examination.

## 2025-03-26 ENCOUNTER — HOME HEALTH ADMISSION (OUTPATIENT)
Dept: HOME HEALTH SERVICES | Facility: HOME HEALTH | Age: OVER 89
End: 2025-03-26
Payer: MEDICARE

## 2025-03-26 ENCOUNTER — DOCUMENTATION (OUTPATIENT)
Dept: HOME HEALTH SERVICES | Facility: HOME HEALTH | Age: OVER 89
End: 2025-03-26
Payer: MEDICARE

## 2025-03-26 NOTE — HH CARE COORDINATION
Home Care received a Referral for Nursing. We have processed the referral for a Start of Care on 03/28-03/29.     If you have any questions or concerns, please feel free to contact us at [phone]. Follow the prompts, enter your five digit zip code, and you will be directed to your care team on [facility].

## 2025-03-27 ENCOUNTER — HOME CARE VISIT (OUTPATIENT)
Dept: HOME HEALTH SERVICES | Facility: HOME HEALTH | Age: OVER 89
End: 2025-03-27
Payer: MEDICARE

## 2025-03-27 VITALS
HEIGHT: 60 IN | RESPIRATION RATE: 19 BRPM | OXYGEN SATURATION: 98 % | TEMPERATURE: 97.7 F | BODY MASS INDEX: 29.45 KG/M2 | HEART RATE: 83 BPM | WEIGHT: 150 LBS | DIASTOLIC BLOOD PRESSURE: 62 MMHG | SYSTOLIC BLOOD PRESSURE: 112 MMHG

## 2025-03-27 LAB — BACTERIA UR CULT: NORMAL

## 2025-03-27 PROCEDURE — 169592 NO-PAY CLAIM PROCEDURE

## 2025-03-27 PROCEDURE — G0299 HHS/HOSPICE OF RN EA 15 MIN: HCPCS | Mod: HHH

## 2025-03-28 LAB
BACTERIA SPEC AEROBE CULT: NORMAL
BACTERIA SPEC ANAEROBE CULT: NORMAL

## 2025-03-28 ASSESSMENT — ENCOUNTER SYMPTOMS
CHANGE IN APPETITE: UNCHANGED
OCCASIONAL FEELINGS OF UNSTEADINESS: 0
DENIES PAIN: 1
APPETITE LEVEL: GOOD

## 2025-03-28 ASSESSMENT — PAIN SCALES - PAIN ASSESSMENT IN ADVANCED DEMENTIA (PAINAD)
CONSOLABILITY: 0
TOTALSCORE: 0
NEGVOCALIZATION: 0
NEGVOCALIZATION: 0 - NONE.
CONSOLABILITY: 0 - NO NEED TO CONSOLE.
FACIALEXPRESSION: 0
FACIALEXPRESSION: 0 - SMILING OR INEXPRESSIVE.
BODYLANGUAGE: 0
BREATHING: 0
BODYLANGUAGE: 0 - RELAXED.

## 2025-03-28 ASSESSMENT — ACTIVITIES OF DAILY LIVING (ADL)
OASIS_M1830: 03
ENTERING_EXITING_HOME: MODERATE ASSIST

## 2025-03-29 ENCOUNTER — HOME CARE VISIT (OUTPATIENT)
Dept: HOME HEALTH SERVICES | Facility: HOME HEALTH | Age: OVER 89
End: 2025-03-29
Payer: MEDICARE

## 2025-03-29 VITALS
TEMPERATURE: 97.9 F | HEART RATE: 100 BPM | DIASTOLIC BLOOD PRESSURE: 64 MMHG | SYSTOLIC BLOOD PRESSURE: 110 MMHG | OXYGEN SATURATION: 97 % | RESPIRATION RATE: 16 BRPM

## 2025-03-29 PROCEDURE — G0300 HHS/HOSPICE OF LPN EA 15 MIN: HCPCS | Mod: HHH

## 2025-03-29 ASSESSMENT — ENCOUNTER SYMPTOMS
APPETITE LEVEL: GOOD
LAST BOWEL MOVEMENT: 67292
CHANGE IN APPETITE: UNCHANGED
DENIES PAIN: 1

## 2025-04-01 ENCOUNTER — HOME CARE VISIT (OUTPATIENT)
Dept: HOME HEALTH SERVICES | Facility: HOME HEALTH | Age: OVER 89
End: 2025-04-01
Payer: MEDICARE

## 2025-04-01 VITALS
TEMPERATURE: 97.8 F | RESPIRATION RATE: 18 BRPM | SYSTOLIC BLOOD PRESSURE: 122 MMHG | OXYGEN SATURATION: 96 % | DIASTOLIC BLOOD PRESSURE: 56 MMHG | HEART RATE: 88 BPM

## 2025-04-01 LAB
BACTERIA SPEC AEROBE CULT: NORMAL
BACTERIA SPEC ANAEROBE CULT: NORMAL

## 2025-04-01 PROCEDURE — G0299 HHS/HOSPICE OF RN EA 15 MIN: HCPCS | Mod: HHH

## 2025-04-01 SDOH — ECONOMIC STABILITY: GENERAL

## 2025-04-01 ASSESSMENT — ENCOUNTER SYMPTOMS
DENIES PAIN: 1
APPETITE LEVEL: GOOD
PERSON REPORTING PAIN: PATIENT
CHANGE IN APPETITE: UNCHANGED
LOWER EXTREMITY EDEMA: 1
MUSCLE WEAKNESS: 1

## 2025-04-01 ASSESSMENT — ACTIVITIES OF DAILY LIVING (ADL)
AMBULATION ASSISTANCE: STAND BY ASSIST
MONEY MANAGEMENT (EXPENSES/BILLS): INDEPENDENT

## 2025-04-05 ENCOUNTER — HOME CARE VISIT (OUTPATIENT)
Dept: HOME HEALTH SERVICES | Facility: HOME HEALTH | Age: OVER 89
End: 2025-04-05
Payer: MEDICARE

## 2025-04-05 VITALS
DIASTOLIC BLOOD PRESSURE: 74 MMHG | RESPIRATION RATE: 20 BRPM | HEART RATE: 96 BPM | TEMPERATURE: 97.1 F | SYSTOLIC BLOOD PRESSURE: 124 MMHG | OXYGEN SATURATION: 99 %

## 2025-04-05 PROCEDURE — G0300 HHS/HOSPICE OF LPN EA 15 MIN: HCPCS | Mod: HHH

## 2025-04-05 ASSESSMENT — ENCOUNTER SYMPTOMS
CHANGE IN APPETITE: UNCHANGED
DENIES PAIN: 1
APPETITE LEVEL: GOOD
LOWER EXTREMITY EDEMA: 1

## 2025-04-05 ASSESSMENT — ACTIVITIES OF DAILY LIVING (ADL): CURRENT_FUNCTION: STAND BY ASSIST

## 2025-04-08 ENCOUNTER — HOME CARE VISIT (OUTPATIENT)
Dept: HOME HEALTH SERVICES | Facility: HOME HEALTH | Age: OVER 89
End: 2025-04-08
Payer: MEDICARE

## 2025-04-08 PROCEDURE — G0299 HHS/HOSPICE OF RN EA 15 MIN: HCPCS | Mod: HHH

## 2025-04-08 SDOH — ECONOMIC STABILITY: GENERAL

## 2025-04-08 ASSESSMENT — ACTIVITIES OF DAILY LIVING (ADL)
AMBULATION ASSISTANCE: 1
AMBULATION ASSISTANCE: STAND BY ASSIST
MONEY MANAGEMENT (EXPENSES/BILLS): INDEPENDENT

## 2025-04-08 ASSESSMENT — ENCOUNTER SYMPTOMS
PERSON REPORTING PAIN: PATIENT
CHANGE IN APPETITE: UNCHANGED
APPETITE LEVEL: GOOD
DENIES PAIN: 1
MUSCLE WEAKNESS: 1

## 2025-04-11 ENCOUNTER — HOME CARE VISIT (OUTPATIENT)
Dept: HOME HEALTH SERVICES | Facility: HOME HEALTH | Age: OVER 89
End: 2025-04-11
Payer: MEDICARE

## 2025-04-11 VITALS
HEART RATE: 94 BPM | TEMPERATURE: 98.3 F | SYSTOLIC BLOOD PRESSURE: 118 MMHG | DIASTOLIC BLOOD PRESSURE: 62 MMHG | OXYGEN SATURATION: 96 %

## 2025-04-11 PROCEDURE — G0299 HHS/HOSPICE OF RN EA 15 MIN: HCPCS | Mod: HHH

## 2025-04-11 SDOH — ECONOMIC STABILITY: GENERAL

## 2025-04-11 ASSESSMENT — ENCOUNTER SYMPTOMS
DRY SKIN: 1
APPETITE LEVEL: GOOD
CHANGE IN APPETITE: UNCHANGED

## 2025-04-11 ASSESSMENT — ACTIVITIES OF DAILY LIVING (ADL)
AMBULATION ASSISTANCE: STAND BY ASSIST
AMBULATION ASSISTANCE: 1
MONEY MANAGEMENT (EXPENSES/BILLS): INDEPENDENT

## 2025-04-15 ENCOUNTER — HOME CARE VISIT (OUTPATIENT)
Dept: HOME HEALTH SERVICES | Facility: HOME HEALTH | Age: OVER 89
End: 2025-04-15
Payer: MEDICARE

## 2025-04-15 VITALS
DIASTOLIC BLOOD PRESSURE: 62 MMHG | HEART RATE: 90 BPM | RESPIRATION RATE: 18 BRPM | OXYGEN SATURATION: 95 % | TEMPERATURE: 98.5 F | SYSTOLIC BLOOD PRESSURE: 114 MMHG

## 2025-04-15 PROCEDURE — G0299 HHS/HOSPICE OF RN EA 15 MIN: HCPCS | Mod: HHH

## 2025-04-15 SDOH — ECONOMIC STABILITY: GENERAL

## 2025-04-15 ASSESSMENT — ENCOUNTER SYMPTOMS
PERSON REPORTING PAIN: PATIENT
DENIES PAIN: 1
APPETITE LEVEL: GOOD
CHANGE IN APPETITE: UNCHANGED
LOWER EXTREMITY EDEMA: 1

## 2025-04-15 ASSESSMENT — ACTIVITIES OF DAILY LIVING (ADL)
AMBULATION ASSISTANCE: 1
MONEY MANAGEMENT (EXPENSES/BILLS): INDEPENDENT

## 2025-04-18 ENCOUNTER — HOME CARE VISIT (OUTPATIENT)
Dept: HOME HEALTH SERVICES | Facility: HOME HEALTH | Age: OVER 89
End: 2025-04-18
Payer: MEDICARE

## 2025-04-18 VITALS
SYSTOLIC BLOOD PRESSURE: 148 MMHG | HEART RATE: 91 BPM | TEMPERATURE: 97.6 F | OXYGEN SATURATION: 98 % | RESPIRATION RATE: 18 BRPM | DIASTOLIC BLOOD PRESSURE: 80 MMHG

## 2025-04-18 PROCEDURE — G0299 HHS/HOSPICE OF RN EA 15 MIN: HCPCS | Mod: HHH

## 2025-04-18 ASSESSMENT — ACTIVITIES OF DAILY LIVING (ADL)
AMBULATION ASSISTANCE: 1
HOME_HEALTH_OASIS: 00
OASIS_M1830: 00
AMBULATION ASSISTANCE: INDEPENDENT

## 2025-04-18 ASSESSMENT — ENCOUNTER SYMPTOMS: DENIES PAIN: 1

## 2025-04-24 ENCOUNTER — TELEPHONE (OUTPATIENT)
Dept: PRIMARY CARE | Facility: CLINIC | Age: OVER 89
End: 2025-04-24
Payer: MEDICARE

## 2025-04-24 DIAGNOSIS — R26.89 UNSTABLE BALANCE: Primary | ICD-10-CM

## 2025-04-24 DIAGNOSIS — R53.81 PHYSICAL DECONDITIONING: ICD-10-CM

## 2025-04-24 NOTE — TELEPHONE ENCOUNTER
Patient was out of PT for one month - leg wound. She needs new referral for PT for core strength, getting out of sitting position, and gait

## 2025-05-10 NOTE — PROGRESS NOTES
Physical Therapy Evaluation/Treatment    Patient Name: Michela Mckee  MRN: [id number]  Encounter Date: 6/10/2025  Time Calculation  Start Time: 1147  Stop Time: 1230  Time Calculation (min): 43 min    Visit Number:  1/12 (including evaluation)  Planned total visits: 12  Visit Authorized/insurance considerations:  2025: NO AUTH, 30.00 [employer], [place] [employer], [place]   Progress Report due visit #10    Current Problem:  Problem List Items Addressed This Visit           ICD-10-CM    Unstable balance R26.89    Relevant Orders    Follow Up In Physical Therapy     Other Visit Diagnoses         Codes      Physical deconditioning     R53.81    Relevant Orders    Follow Up In Physical Therapy          Subjective:  Subjective     SUBJECTIVE:  Main complaint:  I don't want to fall. Feels unsteady when she walks. Needs to hold onto someone when walking in the yard.  Onset Date:  Rx:  4/24/2025  Date of Injury:  NA    Previous Medical Treatment:    NA    Relevant PMH:  Back and neck pain  Bilateal knee OA, knee pain    Red flags:  None at this time    Imaging:  Imaging  No results found.    Cardiology, Vascular, and Other Imaging  No other imaging results found for the past 7 days       Previous Therapy Treatments:    Outpatient over the years  Successful:  Yes      Pt stated Goal:    Wants to feel more steady and not fall    General:  General  Reason for Referral: general deconditoning  General Comment: Pt reports she is unsteady when she walks. She uses her cane and has a Rolator walker.  She needs someone to hold onto when she walks outdoors.  She doesn't want to fall    Precautions:  Precautions  STEADI Fall Risk Score (The score of 4 or more indicates an increased risk of falling): 6/12  Hearing/Visual Limitations: hearing (only one at this time due to her left hear being nicked)  ??macular degeneration    Pain:  Pain Assessment: 0-10  0-10 (Numeric) Pain Score: 0 - No pain (Once in a while she has neck pain and  sometimes her back bothers her)    Home Living:  Home Living Comment: yes    Home type: House split level, bed and bathroom up one flight  Stairs: Yes   Lives with: Alone  Family assist when needed    Vocation:    Retired    Prior Function Per Pt/Caregiver Report:  Level of [place]: Independent with ADLs and functional transfers, Independent with homemaking with ambulation  Receives Help From:  (1x months as a  who also assists with assisting her as needed.)    OBJECTIVE:    Posture:  Posture Comment: Significant forward head, and rounded shoulders    ROM and Strength:    Lumbar:      See below    Lumbar AROM STRENGTH    Flexion WFL fair    Extension 10 pain/dizziness fair    /////////////////////////////////////////////////////////////    AROM STRENGTH    R L R L   Rotation 50% limited 50% limited fair fair   Sidebend 50% limited 50% limited fair fair     Hip:    See below       AROM STRENGTH   Hip R L R L   Hip Flexion wfl wfl 4-/5 4-/5   Hip Abduction wfl wfl 4/5 4/5   Hip Adduction wfl wfl 4-/5 4-/5     Knee:    See below     AROM STRENGTH   Knee R L R L   Knee Flexion wfl wfl 4-/5 4-/5   Knee Extension wfl -10 4/5 4-/5     Ankle:      Strength:  WFL and AROM:  WFL    Flexibility:       R  L   Pectoralis tight tight   Quadriceps tight tight   Hamstring tight tight        Gait:  Gait Comment: Significant decreased nir, heel strike, hip flexion, unsteady    Balance:     Fair- (needs UE assist for transitional movements and minimal of a hurry cane/SPC when ambulating  5x Sit to stand:  34 sec (22.9±9.6 sec (female))    Transfers:  Transfers Comment: Heavy use of UE, uses momentum to assist from sit to stand, B UE needed. Moves slowly and carefully    Outcome Measures:  Other Measures  Activities - Specific Balance Confidence Scale: 560/1600     Assessment       Pt is a 89 y.o. female who presents with generalized core and LE weakness affecting her functional mobility due to decreased balance and  fear of falling  Pt would benefit from skilled physical therapy to improve flexibility, ROM, strength to reduce pain and improve the patient's current level of functioning including routine exercise program.        Based on the history including personal factors and/or comorbidities, examination of body systems including body structures and function, activity limitations, and/or participation restrictions, as well as clinical presentation, patient meets criteria for low complexity evaluation.    Plan  Treatment/Interventions: Education/ Instruction, Gait training, Manual therapy, Neuromuscular re-education, Therapeutic exercises, Therapeutic activities  PT Plan: Skilled PT  PT Frequency: 2 times per week  Duration: 12 week cert period  Onset Date: 04/24/25  Certification Period Start Date: 06/10/25  Certification Period End Date: 09/10/25  Number of Treatments Authorized: 12  Rehab Potential: Good  Plan of Care Agreement: Patient    OP EDUCATION:  Outpatient Education  Individual(s) Educated: Patient  Education Provided: Anatomy, Fall Risk, Home Exercise Program, POC, Home Safety    Goals:  Active       PT Problem - unsteady gait - generalized weakness       PT Goal 1 - STG       Start:  06/10/25    Expected End:  07/25/25       1.  Improve LE strength to 4/5 or better  2.  Improve bilateral hamstring length to WFL  3.  Improve trunk strength to Good  4.  Functional Outcome Measure:  ABC 1000/1600             PT Goal 2 - LT FUNCTIONAL GOALS       Start:  06/10/25    Expected End:  09/08/25       LONG TERM FUNCTIONAL GOALS  1.  Pt able to walk up to 3 minutes in the community using her SPC to allow her to walk safely into doctors appointments and walk from the car inside a small store or post office.  Pt reports increased confidence when walking indoors and community ambulation  2. STS 25 sec or better  3. Independent and compliant with her HEP  4. Pt reports no falls at home             Patient Stated Goal 1        Start:  06/10/25    Expected End:  09/08/25       Wants to feel more steady and not fall             Treatments this date:       Reviewed her HEP  10x each  Marching  Sit to stands  LAQ  HR/TR    Perform HEP as instructed and according to handouts.  Monitor pain levels, stop any exercises that increases pain level and report to therapist next visit.        Billed Treatment Times:  Therapeutic Exercise 10 min    Therapeutic Activities:    OP PT EDUCATION:    Handout for fall prevention at home  Pt is well known to therapist.  Her footwear is problematic.  She refers slip on flats with pointed toes. She tries other slip on shoes but reports she catches her toe and she feel unsafe.  She will try her green pair of shoes which are more appropriate than the slip on she is wearing today.  Rationale for PT POC    Billed Treatment Times:  Therapeutic Activity 10 min    Plan for next visit:  add stretching, progress strengthening for LE and core, NMRE for balance

## 2025-06-10 ENCOUNTER — EVALUATION (OUTPATIENT)
Dept: PHYSICAL THERAPY | Facility: CLINIC | Age: OVER 89
End: 2025-06-10
Payer: MEDICARE

## 2025-06-10 DIAGNOSIS — R53.81 PHYSICAL DECONDITIONING: ICD-10-CM

## 2025-06-10 DIAGNOSIS — R26.89 UNSTABLE BALANCE: ICD-10-CM

## 2025-06-10 PROCEDURE — 97110 THERAPEUTIC EXERCISES: CPT | Mod: GP | Performed by: PHYSICAL THERAPIST

## 2025-06-10 PROCEDURE — 97161 PT EVAL LOW COMPLEX 20 MIN: CPT | Mod: GP | Performed by: PHYSICAL THERAPIST

## 2025-06-10 ASSESSMENT — ENCOUNTER SYMPTOMS
OCCASIONAL FEELINGS OF UNSTEADINESS: 1
DEPRESSION: 0
LOSS OF SENSATION IN FEET: 0

## 2025-06-10 ASSESSMENT — PAIN SCALES - GENERAL: PAINLEVEL_OUTOF10: 0 - NO PAIN

## 2025-06-10 ASSESSMENT — PAIN - FUNCTIONAL ASSESSMENT: PAIN_FUNCTIONAL_ASSESSMENT: 0-10

## 2025-06-11 NOTE — PROGRESS NOTES
Physical Therapy Treatment    Patient Name: Michela Mckee  MRN: [id number]  Encounter date:  6/13/2025  Time Calculation  Start Time: 1446  Stop Time: 1533  Time Calculation (min): 47 min     PT Therapeutic Procedures Time Entry  Therapeutic Exercise Time Entry: 42    Visit Number:  2 (including evaluation)  Planned total visits: 12  Visit Authorized/insurance considerations:  2025: NO AUTH, 30.00 [employer], [place] [employer]   Progress Report due visit #10    Current Problem  Problem List Items Addressed This Visit           ICD-10-CM    Unstable balance R26.89     Other Visit Diagnoses         Codes      Physical deconditioning     R53.81           Precautions         Pain  Pain Assessment: 0-10  0-10 (Numeric) Pain Score: 0 - No pain  Response to Interventions: Decrease in pain    Subjective  General  Reason for Referral: general deconditoning  General Comment: Able to do 4 sts exercises    PT  Visit  Response to Previous Treatment: Patient with no complaints from previous session.    Objective  Decreased heel strike, decreased nir, unsteady  Sts 38 seconds    Treatment:  Therapeutic Exercise  Therapeutic Exercise Performed: Yes   add stretching, progress strengthening for LE and core, NMRE for balance     Nustep, L1, 6 min  AP 20x  LAQ 15x  AB  AB+add (rainbow ball) 15x    1# TheraP bar bicep curls and press out    Current HEP:  AP  LAQ  AB Add + Abd      Has patient been compliant with HEP?  Initiated today    Billed Treatment Times:  Therapeutic Exercise 42 min    OP EDUCATION:  Outpatient Education  Individual(s) Educated: Patient  Education Provided: Home Exercise Program    Assessment:  PT Assessment  Assessment Comment: Pt performed her HEP with moderate cues  Areas of improvements:  initiated HEP  Limitations/deficits:  Weakness and Unstable, difficulty walking    Plan:     Continue with current POC/no changes    Assessment of current progress against goals:  Progressing toward functional  goals    Goals:  Active       PT Problem - unsteady gait - generalized weakness       PT Goal 1 - STG       Start:  06/10/25    Expected End:  07/25/25       1.  Improve LE strength to 4/5 or better  2.  Improve bilateral hamstring length to WFL  3.  Improve trunk strength to Good  4.  Functional Outcome Measure:  ABC 1000/1600             PT Goal 2 - LT FUNCTIONAL GOALS       Start:  06/10/25    Expected End:  09/08/25       LONG TERM FUNCTIONAL GOALS  1.  Pt able to walk up to 3 minutes in the community using her SPC to allow her to walk safely into doctors appointments and walk from the car inside a small store or post office.  Pt reports increased confidence when walking indoors and community ambulation  2. STS 25 sec or better  3. Independent and compliant with her HEP  4. Pt reports no falls at home             Patient Stated Goal 1       Start:  06/10/25    Expected End:  09/08/25       Wants to feel more steady and not fall

## 2025-06-13 ENCOUNTER — TREATMENT (OUTPATIENT)
Dept: PHYSICAL THERAPY | Facility: CLINIC | Age: OVER 89
End: 2025-06-13
Payer: MEDICARE

## 2025-06-13 DIAGNOSIS — R53.81 PHYSICAL DECONDITIONING: ICD-10-CM

## 2025-06-13 DIAGNOSIS — R26.89 UNSTABLE BALANCE: ICD-10-CM

## 2025-06-13 PROCEDURE — 97110 THERAPEUTIC EXERCISES: CPT | Mod: GP | Performed by: PHYSICAL THERAPIST

## 2025-06-13 ASSESSMENT — PAIN - FUNCTIONAL ASSESSMENT: PAIN_FUNCTIONAL_ASSESSMENT: 0-10

## 2025-06-13 ASSESSMENT — PAIN SCALES - GENERAL: PAINLEVEL_OUTOF10: 0 - NO PAIN

## 2025-07-02 ENCOUNTER — DOCUMENTATION (OUTPATIENT)
Dept: PHYSICAL THERAPY | Facility: CLINIC | Age: OVER 89
End: 2025-07-02
Payer: MEDICARE

## 2025-07-02 NOTE — PROGRESS NOTES
Physical Therapy                 Therapy Communication Note    Patient Name: Michela Mckee  MRN: [id number]  Department:   Room: Room/bed info not found  Today's Date: 7/2/2025     Discipline: Physical Therapy    Missed Visit:       Missed Visit Reason:      Missed Time: No Show    Comment:

## 2025-07-09 ENCOUNTER — TREATMENT (OUTPATIENT)
Dept: PHYSICAL THERAPY | Facility: CLINIC | Age: OVER 89
End: 2025-07-09
Payer: MEDICARE

## 2025-07-09 DIAGNOSIS — R53.81 PHYSICAL DECONDITIONING: ICD-10-CM

## 2025-07-09 DIAGNOSIS — R26.89 UNSTABLE BALANCE: ICD-10-CM

## 2025-07-09 PROCEDURE — 97112 NEUROMUSCULAR REEDUCATION: CPT | Mod: GP,CQ

## 2025-07-09 PROCEDURE — 97110 THERAPEUTIC EXERCISES: CPT | Mod: GP,CQ

## 2025-07-09 ASSESSMENT — PAIN SCALES - GENERAL: PAINLEVEL_OUTOF10: 0 - NO PAIN

## 2025-07-09 ASSESSMENT — PAIN - FUNCTIONAL ASSESSMENT: PAIN_FUNCTIONAL_ASSESSMENT: 0-10

## 2025-07-09 NOTE — PROGRESS NOTES
"    Physical Therapy Treatment    Patient Name: Michela Mckee  MRN: [id number]  Encounter date:  7/9/2025  Time Calculation  Start Time: 1242  Stop Time: 1315  Time Calculation (min): 33 min     PT Therapeutic Procedures Time Entry  Therapeutic Exercise Time Entry: 17  Neuromuscular Re-Education Time Entry: 16    Visit Number:  3 (including evaluation)  Planned total visits: 12  Visit Authorized/insurance considerations:  2025: NO AUTH, 30.00 [employer], [place] [employer]   Progress Report due visit #10    Current Problem  Problem List Items Addressed This Visit           ICD-10-CM    Unstable balance R26.89     Other Visit Diagnoses         Codes      Physical deconditioning     R53.81             Precautions         Pain  Pain Assessment: 0-10  0-10 (Numeric) Pain Score: 0 - No pain  Response to Interventions: No change in pain    Subjective  General   12 minutes late to session.   Pt presents for first session June 13, reports some compliance with HEP. Pt denies any falls since last session;  pt reports she would like to continue to work on strength and balance.           Objective  Pt demonstrates subtle decreased trunk rotation during ambulation, along with decreased hip flexion at onset of swing phase and decreased stride length. Pt presents ambulating with hurry cane, able to ambulate short distances in clinic with no assistive device.  Pt ambulates with very slow nir, with or without assistive device.     Treatment:   Therapeutic exercise:   add stretching, progress strengthening for LE and core, NMRE for balance     Nustep, L1, 7 min   s=7;   arms 8   Seated isometric hip adduction, rainbow bal,  x 15 reps   Seated LAQ with isometric ball squeeze, 1#  2 x 10 R and L    Neuromuscular re-education  In // bars:  (each with light CGA assist)  -unsupported standing, normal FERNY, EO and EC x 30\" each, solid floor  -unsupported standing , normal FERNY, EO stand on blue pad, x 30\"  -fwd step ups onto blue pad, R " and L x 5 reps each      Current HEP:  AP  LAQ  AB Add + Abd      Has patient been compliant with HEP?  Yes per pt     OP EDUCATION:   Rationale of TrA bracing with standing exercise and balance training.     Assessment:   Patient tolerated treatment well. Patient appears to be working hard in clinic and motivated to decrease pain and restore all functional deficits. Verbal and/or tactile cues given for proper form, and avoidance of substitution patterns with all exercises. All infection control policies followed during this visit.   Areas of improvements:  exercise volume  Limitations/deficits:  Weakness and Unstable, difficulty walking    Plan:     Continue with current POC/no changes    Assessment of current progress against goals:  Progressing toward functional goals    Goals:  Active       PT Problem - unsteady gait - generalized weakness       PT Goal 1 - STG       Start:  06/10/25    Expected End:  07/25/25       1.  Improve LE strength to 4/5 or better  2.  Improve bilateral hamstring length to WFL  3.  Improve trunk strength to Good  4.  Functional Outcome Measure:  ABC 1000/1600             PT Goal 2 - LT FUNCTIONAL GOALS       Start:  06/10/25    Expected End:  09/08/25       LONG TERM FUNCTIONAL GOALS  1.  Pt able to walk up to 3 minutes in the community using her SPC to allow her to walk safely into doctors appointments and walk from the car inside a small store or post office.  Pt reports increased confidence when walking indoors and community ambulation  2. STS 25 sec or better  3. Independent and compliant with her HEP  4. Pt reports no falls at home             Patient Stated Goal 1       Start:  06/10/25    Expected End:  09/08/25       Wants to feel more steady and not fall

## 2025-07-10 NOTE — PROGRESS NOTES
"    Physical Therapy Treatment    Patient Name: Michela Mckee  MRN: [id number]  Encounter date:  7/11/2025  Time Calculation  Start Time: 1231  Stop Time: 1314  Time Calculation (min): 43 min     PT Therapeutic Procedures Time Entry  Therapeutic Exercise Time Entry: 10  Neuromuscular Re-Education Time Entry: 33    Visit Number:  4 (including evaluation)  Planned total visits: 12  Visit Authorized/insurance considerations:  2025: NO AUTH, 30.00 [employer], [place] [employer]   Progress Report due visit #10    Current Problem  Problem List Items Addressed This Visit           ICD-10-CM    Unstable balance R26.89     Other Visit Diagnoses         Codes      Physical deconditioning     R53.81               Precautions         Pain  Pain Assessment: 0-10  0-10 (Numeric) Pain Score: 7  Pain Type: Chronic pain  Pain Location: Back  Pain Orientation: Lower  Response to Interventions: Decrease in pain    Subjective  General   Pt reports good tolerance to last session, experiencing some DOMS and \"stiffness\" of legs afterward (had been  first clinical session in 3 weeks).  Pt reports knees are stiff at present, LBP rated at 7 of 10 (\"not out of ordinary per pt).  Pt reports minimal compliance with HEP yesterday, was watching tennis all day and then went to an outdoor concert in the evening.            Objective  Pt demonstrates subtle decreased trunk rotation during ambulation, along with decreased hip flexion at onset of swing phase and decreased stride length. Pt presents ambulating with hurry cane, able to ambulate short distances in clinic with no assistive device.  Pt ambulates with very slow nir, with or without assistive device., though even slower nir and shorter stride length without cane.     Treatment:   Therapeutic exercise:   add stretching, progress strengthening for LE and core, NMRE for balance     Nustep, L1, 7 min   s=7;   arms 8   Seated isometric hip adduction, rainbow ball,  x 15 reps   Seated LAQ " "with isometric ball squeeze, 1#  2 x 10 R and L    Neuromuscular re-education  In // bars:  (each with light CGA assist)  -unsupported standing, normal FERNY, EO and EC x 30\" each, solid floor  -unsupported standing ,  NBOS, EO stand, x 30\"  -fwd step ups onto blue pad, R and L x 10 reps each  -unsupported stand, 1 pound thera cane bench press x 10 reps.   -non-resisted side stepping R and  L x 2 laps  -tandem walking, 2 hand rail support, down and back x 1   Current HEP:  AP  LAQ  AB Add + Abd      Has patient been compliant with HEP?  No     OP EDUCATION:   Rationale of TrA bracing with standing exercise and balance training, benefit of consistent compliance with HEP.     Assessment:   Patient tolerated treatment well. Patient appears to be working hard in clinic and motivated to decrease pain and restore all functional deficits. Verbal and/or tactile cues given for proper form, and avoidance of substitution patterns with all exercises. All infection control policies followed during this visit.  No significant progressions secondary to above report of DOMS. Pt reports less stiffness of legs and back post session.   Areas of improvements:  balance progressions  Limitations/deficits:  Weakness and Unstable, difficulty walking    Plan:     Continue with current POC/no changes    Assessment of current progress against goals:  Progressing toward functional goals    Goals:  Active       PT Problem - unsteady gait - generalized weakness       PT Goal 1 - STG       Start:  06/10/25    Expected End:  07/25/25       1.  Improve LE strength to 4/5 or better  2.  Improve bilateral hamstring length to WFL  3.  Improve trunk strength to Good  4.  Functional Outcome Measure:  ABC 1000/1600             PT Goal 2 - LT FUNCTIONAL GOALS       Start:  06/10/25    Expected End:  09/08/25       LONG TERM FUNCTIONAL GOALS  1.  Pt able to walk up to 3 minutes in the community using her SPC to allow her to walk safely into doctors " appointments and walk from the car inside a small store or post office.  Pt reports increased confidence when walking indoors and community ambulation  2. STS 25 sec or better  3. Independent and compliant with her HEP  4. Pt reports no falls at home             Patient Stated Goal 1       Start:  06/10/25    Expected End:  09/08/25       Wants to feel more steady and not fall

## 2025-07-11 ENCOUNTER — TREATMENT (OUTPATIENT)
Dept: PHYSICAL THERAPY | Facility: CLINIC | Age: OVER 89
End: 2025-07-11
Payer: MEDICARE

## 2025-07-11 DIAGNOSIS — R53.81 PHYSICAL DECONDITIONING: ICD-10-CM

## 2025-07-11 DIAGNOSIS — R26.89 UNSTABLE BALANCE: ICD-10-CM

## 2025-07-11 PROCEDURE — 97112 NEUROMUSCULAR REEDUCATION: CPT | Mod: GP,CQ

## 2025-07-11 PROCEDURE — 97110 THERAPEUTIC EXERCISES: CPT | Mod: GP,CQ

## 2025-07-11 ASSESSMENT — PAIN SCALES - GENERAL: PAINLEVEL_OUTOF10: 7

## 2025-07-11 ASSESSMENT — PAIN - FUNCTIONAL ASSESSMENT: PAIN_FUNCTIONAL_ASSESSMENT: 0-10

## 2025-07-11 NOTE — PROGRESS NOTES
"    Physical Therapy Treatment    Patient Name: Michela Mckee  MRN: [id number]  Encounter date:  7/15/2025  Time Calculation  Start Time: 1146  Stop Time: 1228  Time Calculation (min): 42 min     PT Therapeutic Procedures Time Entry  Therapeutic Exercise Time Entry: 7  Neuromuscular Re-Education Time Entry: 33    Visit Number:  5 (including evaluation)  Planned total visits: 12  Visit Authorized/insurance considerations:  2025: NO AUTH, 30.00 [employer], [place] [employer]   Progress Report due visit #10    Current Problem  Problem List Items Addressed This Visit           ICD-10-CM    Unstable balance R26.89     Other Visit Diagnoses         Codes      Physical deconditioning     R53.81           Precautions  Precautions  Precautions Comment: fall risk      Pain  Pain Assessment: 0-10  0-10 (Numeric) Pain Score: 2  Response to Interventions: No change in pain    Subjective  General  General Comment: Pt reports a fall when her foot caught on the chair after she stood up and attempted to turn.  She fell on her left side hitting her elbow and hip.  Paramedics helped her up.    PT  Visit  Response to Previous Treatment: Patient with no complaints from previous session.    Objective  Pt has large bruise on her left proximal elbow    Treatment:  Therapeutic Exercise  Therapeutic Exercise Performed: Yes   add stretching, progress strengthening for LE and core, NMRE for balance      Nustep, L1, 7 min   s=7;   arms 8 for core and LE strengthening  Seated isometric hip adduction, rainbow ball,  x 15 reps DNP  Seated LAQ with isometric ball squeeze, 1#  2 x 10 R and L DNP       Current HEP:  AP  LAQ  AB Add + Abd    Has patient been compliant with HEP?  Yes    Billed Treatment Times:  Therapeutic Exercise 7 min    Balance/Neuromuscular Re-Education  Balance/Neuromuscular Re-Education Activity Performed: Yes  Balance/NMRE:     In // bars:  (each with light CGA assist)  -unsupported standing, normal FERNY, EO and EC x 30\" each, " "solid floor  -unsupported standing, staggered FERNY, EO and EC x 30\" each, solid floor VBC to return to mid foot    Gait belt, min/cga:  ball pass, 7 0z red ball, forward 12 ft x 2, min A  Backwards walking 6ft x 2  Side stepping 8 feet to left, 6 feet to right  -fwd step ups onto blue pad, R and L x 10 reps each    DNP  -unsupported standing ,  NBOS, EO stand, x 30\"  -unsupported stand, 1 pound thera cane bench press x 10 reps.   -non-resisted side stepping R and  L x 2 laps  -tandem walking, 2 hand rail support, down and back x 1   Billed Treatment Times:  Neuromuscular Re-education 33 min    OP EDUCATION:       Assessment:  PT Assessment  Assessment Comment: Pt moved very slow and cautiously, small step length, shuffling, decreased heel strike, requring a gait belt and CGA for safety.  Areas of improvements:  Decreased pain  Limitations/deficits:  Weakness, Unstable, and varying levels of knee pain, recent fall    Plan:     Continue with current POC/no changes    Assessment of current progress against goals:  Progressing toward functional goals    Goals:  Active       PT Problem - unsteady gait - generalized weakness       PT Goal 1 - STG       Start:  06/10/25    Expected End:  07/25/25       1.  Improve LE strength to 4/5 or better  2.  Improve bilateral hamstring length to WFL  3.  Improve trunk strength to Good  4.  Functional Outcome Measure:  ABC 1000/1600             PT Goal 2 - LT FUNCTIONAL GOALS       Start:  06/10/25    Expected End:  09/08/25       LONG TERM FUNCTIONAL GOALS  1.  Pt able to walk up to 3 minutes in the community using her SPC to allow her to walk safely into doctors appointments and walk from the car inside a small store or post office.  Pt reports increased confidence when walking indoors and community ambulation  2. STS 25 sec or better  3. Independent and compliant with her HEP  4. Pt reports no falls at home             Patient Stated Goal 1       Start:  06/10/25    Expected End:  " 09/08/25       Wants to feel more steady and not fall

## 2025-07-15 ENCOUNTER — TREATMENT (OUTPATIENT)
Dept: PHYSICAL THERAPY | Facility: CLINIC | Age: OVER 89
End: 2025-07-15
Payer: MEDICARE

## 2025-07-15 DIAGNOSIS — R53.81 PHYSICAL DECONDITIONING: ICD-10-CM

## 2025-07-15 DIAGNOSIS — R26.89 UNSTABLE BALANCE: ICD-10-CM

## 2025-07-15 PROCEDURE — 97110 THERAPEUTIC EXERCISES: CPT | Mod: GP | Performed by: PHYSICAL THERAPIST

## 2025-07-15 PROCEDURE — 97112 NEUROMUSCULAR REEDUCATION: CPT | Mod: GP | Performed by: PHYSICAL THERAPIST

## 2025-07-15 ASSESSMENT — PAIN SCALES - GENERAL: PAINLEVEL_OUTOF10: 2

## 2025-07-15 ASSESSMENT — PAIN - FUNCTIONAL ASSESSMENT: PAIN_FUNCTIONAL_ASSESSMENT: 0-10

## 2025-07-15 NOTE — PROGRESS NOTES
Physical Therapy Treatment    Patient Name: Michela Mckee  MRN: [id number]  Encounter date:  7/17/2025  Time Calculation  Start Time: 1107  Stop Time: 1155  Time Calculation (min): 48 min     PT Therapeutic Procedures Time Entry  Therapeutic Exercise Time Entry: 20  Neuromuscular Re-Education Time Entry: 18    Visit Number:  6 (including evaluation)  Planned total visits: 12  Visit Authorized/insurance considerations:  2025: NO AUTH, 30.00 [employer], [employer] [employer], [place], [place]   Progress Report due visit #10    Current Problem  Problem List Items Addressed This Visit           ICD-10-CM    Unstable balance R26.89     Other Visit Diagnoses         Codes      Physical deconditioning     R53.81           Precautions  Precautions  Precautions Comment: fall risk      Pain  Pain Assessment: 0-10  0-10 (Numeric) Pain Score: 5 - Moderate pain  Response to Interventions: No change in pain    Subjective  General  General Comment: pt reports a large bruised area on her hip.  it is difficulty to sit and put pressure on it    PT  Visit  Response to Previous Treatment: Patient with no complaints from previous session.    Objective  Pt having increased difficulty with transfers today.  Pt is moving very slowly today and needs extra time after she stands prior to taking a step    Treatment:  Therapeutic Exercise  Therapeutic Exercise Performed: Yes   add stretching, progress strengthening for LE and core, NMRE for balance.  Pt was sore and weaker after her fall.  Modified some of the exercises due to her having difficulty getting up.      Nustep, L1, 7 min   s=7;   arms 8 for core and LE strengthening  Seated isometric hip adduction, rainbow ball,  x 15x  Seated LAQ with isometric ball squeeze, 1#  2 x 10 R and L        Current HEP:  AP  LAQ  AB Add + Abd    Has patient been compliant with HEP?  Yes    Billed Treatment Times:  Therapeutic Exercise 20 min    Balance/Neuromuscular Re-Education  Balance/Neuromuscular Re-Education  "Activity Performed: Yes  Balance/NMRE:     In // bars:  (each with light CGA assist) (not available)    Plinth with CGA:  Side step along the plinth with 2 UE A  -unsupported standing, normal FERNY, EO and EC x 30\" each, solid floor  -unsupported standing, staggered FERNY, EO and EC x 30\" each, solid floor VBC to return to mid foot  Two jennifer step overs along the plinth min A     DNP  Gait belt, min/cga:  ball pass, 7 0z red ball, forward 12 ft x 2, min A  Backwards walking 6ft x 2  Side stepping 8 feet to left, 6 feet to right  -fwd step ups onto blue pad, R and L x 10 reps each     -unsupported stand, 1 pound thera cane bench press x 10 reps.   -tandem walking, 2 hand rail support, down and back x 1     Billed Treatment Times:  Neuromuscular Re-education 18 min    OP EDUCATION:       Assessment:  PT Assessment  Assessment Comment: Fair endurance today.  No LOB ambulating; however, required CGA ambulating around the clinic.  Pt walked out to [employer] and car due to weather was extremely windy and pt's unsteadiness this date  Areas of improvements:  improved balance but level of assistance is inconsistent from day to day  Limitations/deficits:  Weakness, Unstable, and varying pain, recent fall    Plan:     Continue with current POC/no changes    Assessment of current progress against goals:  Progressing toward functional goals    Goals:  Active       PT Problem - unsteady gait - generalized weakness       PT Goal 1 - STG       Start:  06/10/25    Expected End:  07/25/25       1.  Improve LE strength to 4/5 or better  2.  Improve bilateral hamstring length to WFL  3.  Improve trunk strength to Good  4.  Functional Outcome Measure:  ABC 1000/1600             PT Goal 2 - LT FUNCTIONAL GOALS       Start:  06/10/25    Expected End:  09/08/25       LONG TERM FUNCTIONAL GOALS  1.  Pt able to walk up to 3 minutes in the community using her SPC to allow her to walk safely into doctors appointments and walk from the car inside a " small store or post office.  Pt reports increased confidence when walking indoors and community ambulation  2. STS 25 sec or better  3. Independent and compliant with her HEP  4. Pt reports no falls at home             Patient Stated Goal 1       Start:  06/10/25    Expected End:  09/08/25       Wants to feel more steady and not fall

## 2025-07-17 ENCOUNTER — TREATMENT (OUTPATIENT)
Dept: PHYSICAL THERAPY | Facility: CLINIC | Age: OVER 89
End: 2025-07-17
Payer: MEDICARE

## 2025-07-17 DIAGNOSIS — R53.81 PHYSICAL DECONDITIONING: ICD-10-CM

## 2025-07-17 DIAGNOSIS — R26.89 UNSTABLE BALANCE: ICD-10-CM

## 2025-07-17 PROCEDURE — 97112 NEUROMUSCULAR REEDUCATION: CPT | Mod: GP | Performed by: PHYSICAL THERAPIST

## 2025-07-17 PROCEDURE — 97110 THERAPEUTIC EXERCISES: CPT | Mod: GP | Performed by: PHYSICAL THERAPIST

## 2025-07-17 ASSESSMENT — PAIN - FUNCTIONAL ASSESSMENT: PAIN_FUNCTIONAL_ASSESSMENT: 0-10

## 2025-07-17 ASSESSMENT — PAIN SCALES - GENERAL: PAINLEVEL_OUTOF10: 5 - MODERATE PAIN

## 2025-07-17 NOTE — PROGRESS NOTES
Physical Therapy Treatment    Patient Name: Michela Mckee  MRN: [id number]  Encounter date:  7/22/2025  Time Calculation  Start Time: 1207  Stop Time: 1240  Time Calculation (min): 33 min     PT Therapeutic Procedures Time Entry  Therapeutic Exercise Time Entry: 24    Visit Number:  7 (including evaluation)  Planned total visits: 12  Visit Authorized/insurance considerations:  2025: NO AUTH, 30.00 [employer], [employer] [employer], [place]   Progress Report due visit #10    Current Problem  Problem List Items Addressed This Visit           ICD-10-CM    Unstable balance R26.89     Other Visit Diagnoses         Codes      Physical deconditioning     R53.81           Precautions  Precautions  Precautions Comment: Pt had assistance by another pt coming into building today.  Pt states she didn't think she needed it but the patient felt she was unsteady      Pain  Pain Assessment: 0-10  0-10 (Numeric) Pain Score: 0 - No pain    Subjective  General  General Comment: Pt very unsteady today.  Different areas that hurt after her fall.    PT  Visit  Response to Previous Treatment: Patient with no complaints from previous session.    Objective  Decreased nir, WBO, pt ambulating very careful with some unsteadiness    Treatment:  Therapeutic Exercise  Therapeutic Exercise Performed: Yes   add stretching, progress strengthening for LE and core, NMRE for balance.  Pt was sore and weaker after her fall.  Modified some of the exercises due to her having difficulty getting up.      Pt moving more slowly today  Nustep, L1, 7 min   s=7;   arms 8 for core and LE strengthening      Seated:  Seated isometric hip adduction, rainbow ball,  x 15x  Seated LAQ with isometric ball squeeze, 0# (NO BALL today) 2 x 10 R and L; held weights today due to pt being unsteady.  Yellow hip core abduction 15x  Mini march 15x    Pt walked out to her care by the PT and helped her into the care for safety, CGA    Current HEP:  AP  LAQ  AB Add + Abd    Has  patient been compliant with HEP?  Yes    Billed Treatment Times:  Therapeutic Exercise 24 min    DNP  Balance/NMRE:      add stretching, progress strengthening for LE and core, NMRE for balance.  Pt was sore and weaker after her fall.  Modified some of the exercises due to her having difficulty getting up.      Nustep, L1, 7 min   s=7;   arms 8 for core and LE strengthening  Seated isometric hip adduction, rainbow ball,  x 15x  Seated LAQ with isometric ball squeeze, 1#  2 x 10 R and L        Current HEP:  AP  LAQ  AB Add + Abd    OP EDUCATION:       Assessment:  PT Assessment  Assessment Comment: discussed transition to OP home therapy provided by [facility].  Pt provided printout with information. Pt is receptive to this as an alternative because she is having more and more difficulty leaving the house and states she doesn't go out much.  Pt will discuss with her daughter.  Areas of improvements:  pt presents with set back today due to unsteadiness  Limitations/deficits:  Weakness and Unstable bilateral knee    Plan:     Continue with current POC/no changes    Assessment of current progress against goals:  Progressing toward functional goals    Goals:  Active       PT Problem - unsteady gait - generalized weakness       PT Goal 1 - STG       Start:  06/10/25    Expected End:  07/25/25       1.  Improve LE strength to 4/5 or better  2.  Improve bilateral hamstring length to WFL  3.  Improve trunk strength to Good  4.  Functional Outcome Measure:  ABC 1000/1600             PT Goal 2 - LT FUNCTIONAL GOALS       Start:  06/10/25    Expected End:  09/08/25       LONG TERM FUNCTIONAL GOALS  1.  Pt able to walk up to 3 minutes in the community using her SPC to allow her to walk safely into doctors appointments and walk from the car inside a small store or post office.  Pt reports increased confidence when walking indoors and community ambulation  2. STS 25 sec or better  3. Independent and compliant with her HEP  4. Pt reports  no falls at home             Patient Stated Goal 1       Start:  06/10/25    Expected End:  09/08/25       Wants to feel more steady and not fall

## 2025-07-22 ENCOUNTER — TREATMENT (OUTPATIENT)
Dept: PHYSICAL THERAPY | Facility: CLINIC | Age: OVER 89
End: 2025-07-22
Payer: MEDICARE

## 2025-07-22 DIAGNOSIS — R26.89 UNSTABLE BALANCE: ICD-10-CM

## 2025-07-22 DIAGNOSIS — R53.81 PHYSICAL DECONDITIONING: ICD-10-CM

## 2025-07-22 PROCEDURE — 97110 THERAPEUTIC EXERCISES: CPT | Mod: GP | Performed by: PHYSICAL THERAPIST

## 2025-07-22 ASSESSMENT — PAIN - FUNCTIONAL ASSESSMENT: PAIN_FUNCTIONAL_ASSESSMENT: 0-10

## 2025-07-22 ASSESSMENT — PAIN SCALES - GENERAL: PAINLEVEL_OUTOF10: 0 - NO PAIN

## 2025-08-06 DIAGNOSIS — N30.01 ACUTE CYSTITIS WITH HEMATURIA: Primary | ICD-10-CM

## 2025-08-17 LAB
APPEARANCE UR: ABNORMAL
BACTERIA #/AREA URNS HPF: ABNORMAL /HPF
BACTERIA UR CULT: ABNORMAL
BILIRUB UR QL STRIP: NEGATIVE
COLOR UR: ABNORMAL
GLUCOSE UR QL STRIP: NEGATIVE
HGB UR QL STRIP: ABNORMAL
HYALINE CASTS #/AREA URNS LPF: ABNORMAL /LPF
KETONES UR QL STRIP: ABNORMAL
LEUKOCYTE ESTERASE UR QL STRIP: ABNORMAL
NITRITE UR QL STRIP: POSITIVE
PH UR STRIP: 6 [PH] (ref 5–8)
PROT UR QL STRIP: ABNORMAL
RBC #/AREA URNS HPF: ABNORMAL /HPF
SERVICE CMNT-IMP: ABNORMAL
SP GR UR STRIP: 1.02 (ref 1–1.03)
SQUAMOUS #/AREA URNS HPF: ABNORMAL /HPF
WBC #/AREA URNS HPF: ABNORMAL /HPF

## 2025-08-18 ENCOUNTER — TELEPHONE (OUTPATIENT)
Dept: PRIMARY CARE | Facility: CLINIC | Age: OVER 89
End: 2025-08-18
Payer: MEDICARE

## 2025-08-18 ENCOUNTER — RESULTS FOLLOW-UP (OUTPATIENT)
Dept: PRIMARY CARE | Facility: CLINIC | Age: OVER 89
End: 2025-08-18
Payer: MEDICARE

## 2025-08-18 DIAGNOSIS — N30.01 ACUTE CYSTITIS WITH HEMATURIA: Primary | ICD-10-CM

## 2025-08-18 RX ORDER — SULFAMETHOXAZOLE AND TRIMETHOPRIM 800; 160 MG/1; MG/1
1 TABLET ORAL 2 TIMES DAILY
Qty: 20 TABLET | Refills: 0 | Status: SHIPPED | OUTPATIENT
Start: 2025-08-18 | End: 2025-08-28

## 2025-09-02 ENCOUNTER — TELEPHONE (OUTPATIENT)
Dept: PRIMARY CARE | Facility: CLINIC | Age: OVER 89
End: 2025-09-02
Payer: MEDICARE

## 2025-09-03 DIAGNOSIS — N30.01 ACUTE CYSTITIS WITH HEMATURIA: Primary | ICD-10-CM

## 2025-09-09 ENCOUNTER — APPOINTMENT (OUTPATIENT)
Dept: DERMATOLOGY | Facility: CLINIC | Age: OVER 89
End: 2025-09-09
Payer: MEDICARE

## 2025-09-12 ENCOUNTER — APPOINTMENT (OUTPATIENT)
Dept: PRIMARY CARE | Facility: CLINIC | Age: OVER 89
End: 2025-09-12
Payer: MEDICARE

## 2025-10-14 ENCOUNTER — APPOINTMENT (OUTPATIENT)
Dept: PRIMARY CARE | Facility: CLINIC | Age: OVER 89
End: 2025-10-14
Payer: MEDICARE

## 2025-12-12 ENCOUNTER — APPOINTMENT (OUTPATIENT)
Dept: PRIMARY CARE | Facility: CLINIC | Age: OVER 89
End: 2025-12-12
Payer: MEDICARE